# Patient Record
Sex: FEMALE | Race: BLACK OR AFRICAN AMERICAN | Employment: UNEMPLOYED | URBAN - METROPOLITAN AREA
[De-identification: names, ages, dates, MRNs, and addresses within clinical notes are randomized per-mention and may not be internally consistent; named-entity substitution may affect disease eponyms.]

---

## 2017-12-29 PROBLEM — K21.9 GASTROESOPHAGEAL REFLUX DISEASE WITHOUT ESOPHAGITIS: Status: ACTIVE | Noted: 2017-12-29

## 2017-12-29 PROBLEM — G47.33 OSA (OBSTRUCTIVE SLEEP APNEA): Status: ACTIVE | Noted: 2017-12-29

## 2017-12-29 PROBLEM — R06.02 SOB (SHORTNESS OF BREATH): Status: ACTIVE | Noted: 2017-12-29

## 2018-03-15 ENCOUNTER — HOSPITAL ENCOUNTER (OUTPATIENT)
Age: 58
Discharge: HOME | End: 2018-03-17
Payer: MEDICAID

## 2018-03-15 DIAGNOSIS — J84.9 ILD (INTERSTITIAL LUNG DISEASE) (HCC): ICD-10-CM

## 2018-03-15 PROCEDURE — 86038 ANTINUCLEAR ANTIBODIES: CPT

## 2018-03-15 PROCEDURE — 86255 FLUORESCENT ANTIBODY SCREEN: CPT

## 2018-03-15 PROCEDURE — 86039 ANTINUCLEAR ANTIBODIES (ANA): CPT

## 2018-03-15 PROCEDURE — 86431 RHEUMATOID FACTOR QUANT: CPT

## 2018-03-15 PROCEDURE — 86235 NUCLEAR ANTIGEN ANTIBODY: CPT

## 2018-03-15 PROCEDURE — 86140 C-REACTIVE PROTEIN: CPT

## 2018-03-15 PROCEDURE — 85651 RBC SED RATE NONAUTOMATED: CPT

## 2018-03-15 PROCEDURE — 86225 DNA ANTIBODY NATIVE: CPT

## 2018-03-16 LAB
ANA PATTERN: ABNORMAL
ANA TITER: ABNORMAL
ANTI DNA DOUBLE STRANDED: NEGATIVE
ANTI-NUCLEAR ANTIBODY (ANA): POSITIVE
C-REACTIVE PROTEIN: 1.1 MG/DL (ref 0–0.4)
ENA TO SSA (RO) ANTIBODY: POSITIVE
ENA TO SSB (LA) ANTIBODY: NEGATIVE
RHEUMATOID FACTOR: 11 IU/ML (ref 0–13)
SCLERODERMA (SCL-70) AB: NEGATIVE
SEDIMENTATION RATE, ERYTHROCYTE: 41 MM/HR (ref 0–20)

## 2018-03-17 LAB — ANCA IFA: NORMAL

## 2018-03-20 ENCOUNTER — OFFICE VISIT (OUTPATIENT)
Dept: CARDIOLOGY CLINIC | Age: 58
End: 2018-03-20
Payer: MEDICAID

## 2018-03-20 VITALS
RESPIRATION RATE: 16 BRPM | BODY MASS INDEX: 39.27 KG/M2 | WEIGHT: 230 LBS | HEART RATE: 75 BPM | SYSTOLIC BLOOD PRESSURE: 112 MMHG | HEIGHT: 64 IN | DIASTOLIC BLOOD PRESSURE: 76 MMHG

## 2018-03-20 DIAGNOSIS — G47.33 OSA (OBSTRUCTIVE SLEEP APNEA): ICD-10-CM

## 2018-03-20 DIAGNOSIS — I34.0 NON-RHEUMATIC MITRAL REGURGITATION: ICD-10-CM

## 2018-03-20 DIAGNOSIS — K21.9 GASTROESOPHAGEAL REFLUX DISEASE WITHOUT ESOPHAGITIS: ICD-10-CM

## 2018-03-20 DIAGNOSIS — R07.2 PRECORDIAL PAIN: Primary | ICD-10-CM

## 2018-03-20 DIAGNOSIS — E78.2 MIXED HYPERLIPIDEMIA: ICD-10-CM

## 2018-03-20 DIAGNOSIS — I10 ESSENTIAL HYPERTENSION: ICD-10-CM

## 2018-03-20 PROCEDURE — 99214 OFFICE O/P EST MOD 30 MIN: CPT | Performed by: PHYSICIAN ASSISTANT

## 2018-03-20 PROCEDURE — 3017F COLORECTAL CA SCREEN DOC REV: CPT | Performed by: PHYSICIAN ASSISTANT

## 2018-03-20 PROCEDURE — G8484 FLU IMMUNIZE NO ADMIN: HCPCS | Performed by: PHYSICIAN ASSISTANT

## 2018-03-20 PROCEDURE — 93000 ELECTROCARDIOGRAM COMPLETE: CPT | Performed by: INTERNAL MEDICINE

## 2018-03-20 PROCEDURE — G8598 ASA/ANTIPLAT THER USED: HCPCS | Performed by: PHYSICIAN ASSISTANT

## 2018-03-20 PROCEDURE — 4004F PT TOBACCO SCREEN RCVD TLK: CPT | Performed by: PHYSICIAN ASSISTANT

## 2018-03-20 PROCEDURE — G8417 CALC BMI ABV UP PARAM F/U: HCPCS | Performed by: PHYSICIAN ASSISTANT

## 2018-03-20 PROCEDURE — G8427 DOCREV CUR MEDS BY ELIG CLIN: HCPCS | Performed by: PHYSICIAN ASSISTANT

## 2018-03-20 PROCEDURE — 3014F SCREEN MAMMO DOC REV: CPT | Performed by: PHYSICIAN ASSISTANT

## 2018-03-20 NOTE — PATIENT INSTRUCTIONS
fast or irregular heartbeat. After you call 911, the  may tell you to chew 1 adult-strength or 2 to 4 low-dose aspirin. Wait for an ambulance. Do not try to drive yourself. ?Call your doctor today if:  ? · You have any trouble breathing. ? · Your chest pain gets worse. ? · You are dizzy or lightheaded, or you feel like you may faint. ? · You are not getting better as expected. ? · You are having new or different chest pain. Where can you learn more? Go to https://E[employer]BuddiesÃ¢â€[email or web address]¢ InteractivepeSwipesenseeb.[employer] Secure. org and sign in to your [employer] account. Enter A120 in the Xtreme[employer] box to learn more about \"Chest Pain: Care Instructions. \"     If you do not have an account, please click on the \"Sign Up Now\" link. Current as of: March 20, 2017  Content Version: 11.5  © 3862-1638 [employer]. Care instructions adapted under license by [facility] ([facility]). If you have questions about a medical condition or this instruction, always ask your healthcare professional. [place] [address] any warranty or liability for your use of this information.

## 2018-03-26 ENCOUNTER — HOSPITAL ENCOUNTER (OUTPATIENT)
Dept: CARDIOLOGY | Age: 58
Discharge: HOME | End: 2018-03-26
Payer: MEDICAID

## 2018-03-26 DIAGNOSIS — I27.21 PAH (PULMONARY ARTERY HYPERTENSION) (HCC): ICD-10-CM

## 2018-03-26 LAB
LV EF: 60 %
LVEF MODALITY: NORMAL

## 2018-03-26 PROCEDURE — 93306 TTE W/DOPPLER COMPLETE: CPT

## 2018-03-27 ENCOUNTER — TELEPHONE (OUTPATIENT)
Dept: CARDIOLOGY CLINIC | Age: 58
End: 2018-03-27

## 2018-04-09 PROBLEM — L02.93 CARBUNCLE: Status: ACTIVE | Noted: 2018-04-09

## 2018-07-23 PROBLEM — R76.8 ANA POSITIVE: Status: ACTIVE | Noted: 2018-07-23

## 2018-07-30 ENCOUNTER — OFFICE VISIT (OUTPATIENT)
Dept: CARDIOLOGY CLINIC | Age: 58
End: 2018-07-30
Payer: MEDICAID

## 2018-07-30 VITALS
DIASTOLIC BLOOD PRESSURE: 70 MMHG | RESPIRATION RATE: 16 BRPM | BODY MASS INDEX: 40.46 KG/M2 | WEIGHT: 237 LBS | HEART RATE: 78 BPM | HEIGHT: 64 IN | SYSTOLIC BLOOD PRESSURE: 120 MMHG

## 2018-07-30 DIAGNOSIS — I25.10 CORONARY ARTERY DISEASE INVOLVING NATIVE HEART WITHOUT ANGINA PECTORIS, UNSPECIFIED VESSEL OR LESION TYPE: Primary | ICD-10-CM

## 2018-07-30 PROCEDURE — G8427 DOCREV CUR MEDS BY ELIG CLIN: HCPCS | Performed by: INTERNAL MEDICINE

## 2018-07-30 PROCEDURE — G8598 ASA/ANTIPLAT THER USED: HCPCS | Performed by: INTERNAL MEDICINE

## 2018-07-30 PROCEDURE — G8417 CALC BMI ABV UP PARAM F/U: HCPCS | Performed by: INTERNAL MEDICINE

## 2018-07-30 PROCEDURE — 4004F PT TOBACCO SCREEN RCVD TLK: CPT | Performed by: INTERNAL MEDICINE

## 2018-07-30 PROCEDURE — 93000 ELECTROCARDIOGRAM COMPLETE: CPT | Performed by: INTERNAL MEDICINE

## 2018-07-30 PROCEDURE — 3017F COLORECTAL CA SCREEN DOC REV: CPT | Performed by: INTERNAL MEDICINE

## 2018-07-30 PROCEDURE — 99214 OFFICE O/P EST MOD 30 MIN: CPT | Performed by: INTERNAL MEDICINE

## 2018-07-30 NOTE — PROGRESS NOTES
CHIEF COMPLAINT: CAD/MI    HISTORY OF PRESENT ILLNESS: Patient is a 62 y.o. female seen at the request of Can Sadners MD.      Patient in follow up chest pain. Stress 10/17 and echo normal 3/18. Past Medical History:   Diagnosis Date    CAD (coronary artery disease)     Chest pain     HTN (hypertension)     Hyperlipemia        Patient Active Problem List   Diagnosis    CAD (coronary artery disease)    Chest pain    Hyperlipemia    HTN (hypertension)    Gastroesophageal reflux disease without esophagitis    SOB (shortness of breath)    DANA (obstructive sleep apnea)    Carbuncle    CAROLINE positive       No Known Allergies    Current Outpatient Prescriptions   Medication Sig Dispense Refill    traMADol (ULTRAM) 50 MG tablet Take 1 tablet by mouth every 8 hours as needed for Pain for up to 30 days. . 90 tablet 0    amitriptyline (ELAVIL) 10 MG tablet Take 1 tablet by mouth nightly as needed for Sleep or Pain 90 tablet 1    omeprazole (PRILOSEC) 20 MG delayed release capsule Take 20 mg by mouth daily      umeclidinium-vilanterol (ANORO ELLIPTA) 62.5-25 MCG/INH AEPB inhaler Inhale 1 puff into the lungs daily 1 each 5    atorvastatin (LIPITOR) 40 MG tablet Take 1 tablet by mouth daily 90 tablet 3    aspirin 325 MG EC tablet Take 1 tablet by mouth daily 90 tablet 3    metoprolol tartrate (LOPRESSOR) 25 MG tablet Take 1 tablet by mouth daily 180 tablet 3    doxycycline hyclate (VIBRA-TABS) 100 MG tablet Take 1 tablet by mouth 2 times daily for 20 days 40 tablet 0    ADVAIR DISKUS 250-50 MCG/DOSE AEPB Inhale 1 puff into the lungs every 12 hours 60 each 3     No current facility-administered medications for this visit.         Social History     Social History    Marital status: Single     Spouse name: N/A    Number of children: N/A    Years of education: N/A     Occupational History    not employed      Social History Main Topics    Smoking status: Current Every Day Smoker     Packs/day: 1.00 Musculoskeletal: Normal range of motion. No edema and no tenderness. Lymphadenopathy:   No cervical adenopathy. No groin adenopathy. Neurological: Alert and oriented to person, place, and time. Skin: Skin is warm and dry. No rash noted. Not diaphoretic. No erythema. Psychiatric: Normal mood and affect. Behavior is normal.     EKG:  normal sinus rhythm, nonspecific ST and T waves changes. ASSESSMENT AND PLAN:  Patient Active Problem List   Diagnosis    CAD (coronary artery disease)    Chest pain    Hyperlipemia    HTN (hypertension)    Gastroesophageal reflux disease without esophagitis    SOB (shortness of breath)    DANA (obstructive sleep apnea)    Carbuncle    CAROLINE positive     1. Chest pain/CAD:    Stress normal 10/17. Echo with normal heart function and mild MR 3/18. Continue ASA/statin/BB. 2. HTN: Observe. 3. Lipid: Statin. Marcelo Mark D.O.   Cardiologist  Cardiology, [facility]

## 2019-01-26 ENCOUNTER — HOSPITAL ENCOUNTER (OUTPATIENT)
Dept: GENERAL RADIOLOGY | Age: 59
Discharge: HOME | End: 2019-01-28
Payer: MEDICAID

## 2019-01-26 ENCOUNTER — HOSPITAL ENCOUNTER (OUTPATIENT)
Age: 59
End: 2019-01-26
Payer: MEDICAID

## 2019-01-26 DIAGNOSIS — R10.9 ABDOMINAL PAIN, UNSPECIFIED ABDOMINAL LOCATION: ICD-10-CM

## 2019-01-26 PROCEDURE — 74018 RADEX ABDOMEN 1 VIEW: CPT

## 2019-02-25 PROBLEM — M65.342 TRIGGER FINGER OF ALL DIGITS OF LEFT HAND: Status: ACTIVE | Noted: 2019-02-25

## 2019-02-25 PROBLEM — M65.352 TRIGGER FINGER OF ALL DIGITS OF LEFT HAND: Status: ACTIVE | Noted: 2019-02-25

## 2019-02-25 PROBLEM — M65.322 TRIGGER FINGER OF ALL DIGITS OF LEFT HAND: Status: ACTIVE | Noted: 2019-02-25

## 2019-02-25 PROBLEM — M65.312 TRIGGER FINGER OF ALL DIGITS OF LEFT HAND: Status: ACTIVE | Noted: 2019-02-25

## 2019-02-25 PROBLEM — M65.332 TRIGGER FINGER OF ALL DIGITS OF LEFT HAND: Status: ACTIVE | Noted: 2019-02-25

## 2019-04-12 ENCOUNTER — TELEPHONE (OUTPATIENT)
Dept: ORTHOPEDIC SURGERY | Age: 59
End: 2019-04-12

## 2019-04-12 DIAGNOSIS — M65.312 TRIGGER FINGER OF ALL DIGITS OF LEFT HAND: Primary | ICD-10-CM

## 2019-04-12 DIAGNOSIS — M65.332 TRIGGER FINGER OF ALL DIGITS OF LEFT HAND: Primary | ICD-10-CM

## 2019-04-12 DIAGNOSIS — M65.352 TRIGGER FINGER OF ALL DIGITS OF LEFT HAND: Primary | ICD-10-CM

## 2019-04-12 DIAGNOSIS — M65.342 TRIGGER FINGER OF ALL DIGITS OF LEFT HAND: Primary | ICD-10-CM

## 2019-04-12 DIAGNOSIS — M65.322 TRIGGER FINGER OF ALL DIGITS OF LEFT HAND: Primary | ICD-10-CM

## 2019-04-15 ENCOUNTER — TELEPHONE (OUTPATIENT)
Dept: OBGYN | Age: 59
End: 2019-04-15

## 2019-04-15 ENCOUNTER — OFFICE VISIT (OUTPATIENT)
Dept: ORTHOPEDIC SURGERY | Age: 59
End: 2019-04-15
Payer: MEDICAID

## 2019-04-15 VITALS
RESPIRATION RATE: 18 BRPM | HEIGHT: 64 IN | DIASTOLIC BLOOD PRESSURE: 77 MMHG | SYSTOLIC BLOOD PRESSURE: 123 MMHG | WEIGHT: 230 LBS | HEART RATE: 81 BPM | BODY MASS INDEX: 39.27 KG/M2

## 2019-04-15 DIAGNOSIS — M79.641 BILATERAL HAND PAIN: ICD-10-CM

## 2019-04-15 DIAGNOSIS — M79.642 BILATERAL HAND PAIN: ICD-10-CM

## 2019-04-15 DIAGNOSIS — S69.82XA TFCC (TRIANGULAR FIBROCARTILAGE COMPLEX) INJURY, LEFT, INITIAL ENCOUNTER: ICD-10-CM

## 2019-04-15 DIAGNOSIS — M65.4 TENOSYNOVITIS, DE QUERVAIN: ICD-10-CM

## 2019-04-15 DIAGNOSIS — G56.02 LEFT CARPAL TUNNEL SYNDROME: Primary | ICD-10-CM

## 2019-04-15 PROCEDURE — G8598 ASA/ANTIPLAT THER USED: HCPCS | Performed by: ORTHOPAEDIC SURGERY

## 2019-04-15 PROCEDURE — G8417 CALC BMI ABV UP PARAM F/U: HCPCS | Performed by: ORTHOPAEDIC SURGERY

## 2019-04-15 PROCEDURE — 4004F PT TOBACCO SCREEN RCVD TLK: CPT | Performed by: ORTHOPAEDIC SURGERY

## 2019-04-15 PROCEDURE — G8427 DOCREV CUR MEDS BY ELIG CLIN: HCPCS | Performed by: ORTHOPAEDIC SURGERY

## 2019-04-15 PROCEDURE — 20526 THER INJECTION CARP TUNNEL: CPT | Performed by: ORTHOPAEDIC SURGERY

## 2019-04-15 PROCEDURE — L3908 WHO COCK-UP NONMOLDE PRE OTS: HCPCS | Performed by: ORTHOPAEDIC SURGERY

## 2019-04-15 PROCEDURE — 3017F COLORECTAL CA SCREEN DOC REV: CPT | Performed by: ORTHOPAEDIC SURGERY

## 2019-04-15 PROCEDURE — 99203 OFFICE O/P NEW LOW 30 MIN: CPT | Performed by: ORTHOPAEDIC SURGERY

## 2019-04-15 PROCEDURE — 20605 DRAIN/INJ JOINT/BURSA W/O US: CPT | Performed by: ORTHOPAEDIC SURGERY

## 2019-04-15 RX ORDER — TRAMADOL HYDROCHLORIDE 50 MG/1
TABLET ORAL
Refills: 0 | COMMUNITY
Start: 2019-04-08 | End: 2019-05-31 | Stop reason: SDUPTHER

## 2019-04-15 RX ORDER — BETAMETHASONE SODIUM PHOSPHATE AND BETAMETHASONE ACETATE 3; 3 MG/ML; MG/ML
12 INJECTION, SUSPENSION INTRA-ARTICULAR; INTRALESIONAL; INTRAMUSCULAR; SOFT TISSUE ONCE
Status: COMPLETED | OUTPATIENT
Start: 2019-04-15 | End: 2019-04-15

## 2019-04-15 RX ADMIN — BETAMETHASONE SODIUM PHOSPHATE AND BETAMETHASONE ACETATE 12 MG: 3; 3 INJECTION, SUSPENSION INTRA-ARTICULAR; INTRALESIONAL; INTRAMUSCULAR; SOFT TISSUE at 09:17

## 2019-04-15 NOTE — TELEPHONE ENCOUNTER
Patient called to cancel her appointment on Friday, 4/19 and asked if she could reschedule to 4/22.  Scheduled patient for 4/22/19 @ 1:15p.    -Cain, 4/15/19

## 2019-04-15 NOTE — PROGRESS NOTES
Department of Orthopedic Surgery/Hand Surgery  Resident Office Note        CHIEF COMPLAINT:    Chief Complaint   Patient presents with    Hand Pain     left hand pain        History Obtained From:  patient    HISTORY OF PRESENT ILLNESS:                Irving Gambino is a 62y.o. year old  female who presents for evaluation of left hand pain. she reports this started years ago. she does not remember a specific injury that started the pain. She states her pain is mostly cramping pain to the left hand with use. She does get numbness over the radial fingers. The pain does wake her up at night. Pain is better with wrist brace. She hasn't tried any medication for it at this time. The treatment has not been effective. Patient is being worked up for inflammatory dz with appointment wit rheumatology on Friday. The patient is Right hand dominant. The patients occupation is Disabled . Past Medical History:    Past Medical History:   Diagnosis Date    CAD (coronary artery disease)     Chest pain     HTN (hypertension)     Hyperlipemia      Past Surgical History:    Past Surgical History:   Procedure Laterality Date    CORONARY ANGIOPLASTY WITH STENT PLACEMENT       Current Medications:   No current facility-administered medications for this visit.    Allergies:  No Known Allergies    Social History:   Social History     Socioeconomic History    Marital status: Single     Spouse name: Not on file    Number of children: Not on file    Years of education: Not on file    Highest education level: Not on file   Occupational History    Occupation: not employed   Social Needs    Financial resource strain: Not on file    Food insecurity:     Worry: Not on file     Inability: Not on file   ENEFpro needs:     Medical: Not on file     Non-medical: Not on file   Tobacco Use    Smoking status: Current Every Day Smoker     Packs/day: 1.00     Years: 39.00     Pack years: 39.00     Types: Cigarettes  Smokeless tobacco: Never Used    Tobacco comment: smokes 2 cigs per day   Substance and Sexual Activity    Alcohol use: Yes     Comment: social    Drug use: No    Sexual activity: Not on file   Lifestyle    Physical activity:     Days per week: Not on file     Minutes per session: Not on file    Stress: Not on file   Relationships    Social connections:     Talks on phone: Not on file     Gets together: Not on file     Attends Jew service: Not on file     Active member of club or organization: Not on file     Attends meetings of clubs or organizations: Not on file     Relationship status: Not on file    Intimate partner violence:     Fear of current or ex partner: Not on file     Emotionally abused: Not on file     Physically abused: Not on file     Forced sexual activity: Not on file   Other Topics Concern    Not on file   Social History Narrative    Not on file     Family History:   Family History   Problem Relation Age of Onset    Diabetes Mother     High Blood Pressure Mother     Other Father         bleeding ulcer    Asthma Sister     High Blood Pressure Brother     Diabetes Brother     Stroke Brother     Diabetes Brother     Stroke Brother        REVIEW OF SYSTEMS:    CONSTITUTIONAL:  negative for  fevers, chills, sweats and fatigue  RESPIRATORY:  negative for  dry cough, cough with sputum, dyspnea, wheezing and chest pain  CARDIOVASCULAR:  negative for  chest pain, dyspnea, palpitations, syncope  GASTROINTESTINAL:  negative for nausea, vomiting, change in bowel habits, diarrhea, constipation and abdominal pain  BEHAVIOR/PSYCH:  negative for poor appetite, increased appetite, decreased sleep and poor concentration  MUSCULOSKELETAL:  positive for  myalgias and arthralgias      PHYSICAL EXAM:    VITALS:  /77 (Site: Left Upper Arm, Position: Sitting)   Pulse 81   Resp 18   Ht 5' 4\" (1.626 m)   Wt 230 lb (104.3 kg)   LMP  (LMP Unknown)   BMI 39.48 kg/m²     CONSTITUTIONAL: awake, alert, cooperative, no apparent distress, and appears stated age    LUNGS:  No increased work of breathing, good air exchange, clear to auscultation bilaterally, no crackles or wheezing    CARDIOVASCULAR:  Normal apical impulse, regular rate and rhythm, normal S1 and S2, no S3 or S4, and no murmur noted    ABDOMEN: Soft, Non-tender to palpation     left Hand exam:    The skin overlying the hand is  intact. There is not evidence of scar, lesion, laceration, or abrasion. The motion in the small joints of the hand are intact with no stiffness or deformity. There is no masses or adenopathy in bilateral upper extremities. Radial pulses are 2+ and symmetric bilaterally. Capillary refill is intact and < 2 seconds. Motor strength is 5/5 with flexion and extension of the small finger joints. The ROM to fingers are normal.  APB 5/5, Neg tenderness over the A1 pulleys with no crepitus. + tenderness over the 1st dorsal compartment, + Fovea tenderness  , - Shuck test distal ulna     Left:  Phallens sign negative, Tinnells sign negative, Median nerve compression test positive ,  Finklesteins positive, CMC Grind test positive,       DATA:    Radiology Review:  left Hand XR - 3 views - AP/Lateral/Oblique    Showing grade 2 CMC arthritis , No fractures or dislocation, no soft tissue swelling   Impression : Grade 2 CMC Arthritis     IMPRESSION/RECOMMENDATIONS:      Impression:   B/L Carpal tunnel Syndrome R>L  Left De quervain's  Left Wrist pain possible TFCC injury     Discussed treatment options and diagnosis. Will get B/L EMG to evaluated the carpal tunnel. Will provide steroid injections to the 1st dorsal compartment and the TFCC today and Provide a cock up wrist splint. Follow up after EMG for Review. Procedure Note Wrist Cortisone Injection    The left wrist TFCC was identified as the injection site. The risk and benefits of a cortisone injection were explained and the patient consented to the injection.  Under sterile conditions, the wrist was injected with a mixture of 1 mL of 1% Lidocaine and 1 mL of 6 mg/mL Betamethasone without complication. A sterile bandage was applied. Procedure Note DeQuerveins Injection    The left first dorsal wrist compartment was identified as the injection site. The risk and benefits of a cortisone injection were explained and the patient consented to the injection. Under sterile conditions, the wrist was injected with a mixture of 1 mL of 1% Lidocaine and 1 mL of 6 mg/mL Betamethasone without complication. A sterile bandage was applied. I have seen and evaluated the patient and agree with the above assessment and plan on today's visit. I have performed the key components of the history and physical examination with significant findings of chronic left wrist pain status post injury three years ago. Reports persistent pain over the 1st dorsal extensor Compartment, dorsal ulnar wrist, TFCC, as well as numbness and tingling in the bilateral hands. Diagnostic and potentially therapeutic injections provided for de Quervain's tenosynovitis and degenerative TFCC tear. Follow-up after EMG and nerve conduction studies completed. I concur with the findings and plan as documented.     Raymond Knowles MD  4/15/2019

## 2019-04-29 ENCOUNTER — HOSPITAL ENCOUNTER (OUTPATIENT)
Dept: NEUROLOGY | Age: 59
Discharge: HOME | End: 2019-04-29
Payer: MEDICAID

## 2019-04-29 VITALS — HEIGHT: 65 IN | BODY MASS INDEX: 38.65 KG/M2 | WEIGHT: 232 LBS

## 2019-04-29 DIAGNOSIS — M79.642 BILATERAL HAND PAIN: ICD-10-CM

## 2019-04-29 DIAGNOSIS — M79.641 BILATERAL HAND PAIN: ICD-10-CM

## 2019-04-29 PROCEDURE — 95886 MUSC TEST DONE W/N TEST COMP: CPT

## 2019-04-29 PROCEDURE — 95913 NRV CNDJ TEST 13/> STUDIES: CPT

## 2019-04-29 NOTE — LETTER
RE:  Cyrus Mcgee    Dear Dr. Marcelle Ware,     Enclosed you will find a copy of the requested EMG on your patient, Cyrus Mcgee completed 4/29/2019. EMG Findings:     Gardenia Pittman MD   Physician   Internal Medicine   Procedures   Signed   Date of Service:  4/29/2019 11:18 AM            Procedure Orders   EMG [[id number]] ordered by  at 04/15/19 0849   Pre-procedure Diagnoses   Bilateral hand pain [M79.641, M79.642]   Procedures   EMG [NEU5]          Signed                 Show:Clear all  [x]Manual[x]Template[x]Copied    Added by:  Nancy Espinoza MD      []Hover for details      EMG [address] [facility]  Electrodiagnostic Laboratory   [place]          Full Name:    Cyrus Mcgee                Gender:             Female  MRN:             [id number]                     YOB: 1960  Location[de-identified]     Outpt. Visit Date:                          4/29/2019 08:40  Age:                                   62 Years 5 Months Old  Examining Physician:      Dr. Fanny Pillai  Referring Physician:        Dr. Nancy Caldwell  Technician:                       Janette Dominguez   Height:                               5 feet 5 inch  Weight:                              232 lbs  Notes:                                Bilat. hand pain M79.641      Motor NCS      Nerve / Sites Lat. Lat Diff Amplitude Amp. 1-2 Distance Velocity Temp.     ms ms mV % cm m/s °C   R Median - APB      Wrist 2.86   14.5 100 8   32.9      Elbow 6.67 3.80 13.5 93.3 21 55 32.9   L Median - APB      Wrist 3.07   12.7 100 8   32. 3      Elbow 6.77 3.70 10.3 81 20 54 32.3   R Ulnar - ADM      Wrist 2.40   9.6 100 8   32.5      B. Elbow 5.94 3.54 9.6 99.6 20 56 32.5      A. Elbow 7.66 1.72 9.5 98.8 10 58 32.4   L Ulnar - ADM      Wrist 2.40   10.2 100 8   32.1      B. Elbow 5.78 3.39 10.1 98.9 20 59 32.1      A. Elbow 7.19 1.41 9.5 93.5 10 71 32       Sensory NCS Nerve / Sites Onset Lat Peak Lat PP Amp Distance Velocity Temp.     ms ms µV cm m/s °C   L Median - Digit II (Antidromic)      Mid Palm 1.25 1.67 85.6 7 56 32.1      Wrist 2.50 3.18 58.9 14 56 32.1   R Median - Digit II (Antidromic)      Mid Palm 1.20 1.88 54.3 7 58 32      Wrist 2.66 3.54 41.8 14 53 32   L Ulnar - Digit V (Antidromic)      Wrist 2.55 3.23 42.9 14 55 32.1   R Ulnar - Digit V (Antidromic)      Wrist 2.66 3.18 43.4 14 53 32.1   R Radial - Anatomical snuff box (Forearm)      Forearm 1.61 2.14 40.1 10 62 32.1   L Radial - Anatomical snuff box (Forearm)      Forearm 1.51 2.03 42.7 10 66 32.3       Combined Sensory Index      Nerve / Sites Rec. Site Peak Lat NP Amp PP Amp Segments Peak Diff Temp.       ms µV µV   ms °C   R Median - CSI      Median Thumb 2.92 18.2 22.7 Median - Radial 0.42 32.1      Radial Thumb 2.50 17.8 15.2 Median - Ulnar 0.10 32.1      Median Ring 3.39 20.1 22.0 Median palm - Ulnar palm 0.31 32.1      Ulnar Ring 3.28 14.0 26.6            Median palm Wrist 2.03 35.7 38.1            Ulnar palm Wrist 1.72 13.6 26.1            CSI         CSI 0.83     L Median - CSI      Median Thumb 2.76 35.4 58.5 Median - Radial 0.42 32.1      Radial Thumb 2.34 14.3 14.2 Median - Ulnar 0.00 32      Median Ring 3.44 24.9 33.9 Median palm - Ulnar palm 0.16 32.3      Ulnar Ring 3.44 13.7 7.7            Median palm Wrist 1.88 55.0 65.0            Ulnar palm Wrist 1.72 17.2 26.3            CSI         CSI 0.57             F  Wave      Nerve F Lat M Lat F-M Lat     ms ms ms   R Median - APB 24.9 3.1 21.9   R Ulnar - ADM 26.7 2.2 24.5   L Median - APB 25.1 3.3 21.7   L Ulnar - ADM 25.6 2.5 23.1       EMG                     EMG Summary Table       Spontaneous MUAP Recruitment   Muscle IA Fib PSW Fasc H.F. Amp Dur. PPP Pattern   R. Abductor pollicis brevis N None None None None N N N N   R. First dorsal interosseous N None None None None N N N N   R.  Abductor digiti minimi (manus) N None None None None N N N N positive waves, fibrillation potentials, or abnormal recruitment pattern. There were polyphasic waves found bilaterally in the lower cervical paraspinals. Please refer to the above chart for specific muscles examined and results. .        Impression:    #1 normal nerve conduction studies of the median, ulnar, and radial nerves bilaterally. No diagnostic evidence of entrapment syndromes present in the sampled nerves.     #2 no diagnostic evidence of a motor cervical radiculopathy. Pure sensory radiculopathies cannot be detected by electrodiagnostic technique.     #3 no evidence of primary myopathy.     Clinical correlation recommended. Thank you        Electronically signed by Nathaniel Mendoza MD on 3/52/0816 at 11:18 AM                       History:  Anoop Diaz complains of a \"sharp pain\" in her arms from the palm and wrist into the fingertips, digits 1 through 5. Symptoms are equal bilaterally. Discomfort often times radiates up into the forearm and occasionally further into the shoulder area. It is not related to activities other than \"picking up something heavy\". Occasionally she has complaints of nocturnal symptoms. Although she complains of neck discomfort, there is no radiation into the upper extremities. Also, symptoms are not related to the onset of neck discomfort. She does not suggest any weakness that is progressive involving the upper extremity muscles. .     ROS:   We see the above history. In addition to the neck discomfort and symptoms she describes, she does describe arthritis complaints, and coronary artery disease with difficulty with exercise, walking, dyspnea on exertion, etc. Remainder system review negative other than pain in her shoulders bilaterally. Meds:    Prior to Admission medications    Medication Sig Start Date End Date Taking?  Authorizing Provider   traMADol (ULTRAM) 50 MG tablet TAKE 1 TABLET BY MOUTH EVERY 8 HOURS AS NEEDED FOR PAIN 4/8/19   Historical Provider, MD naproxen (NAPROSYN) 500 MG tablet TAKE 1 TABLET BY MOUTH TWICE DAILY WITH MEALS 3/5/19   Mihir Mccoy MD   atorvastatin (LIPITOR) 40 MG tablet Take 1 tablet by mouth daily 1/23/19 1/23/20  Mihir Mccoy MD   amitriptyline (ELAVIL) 10 MG tablet Take 1 tablet by mouth nightly as needed for Sleep or Pain 1/23/19   Mihir Mccoy MD   omeprazole (PRILOSEC) 20 MG delayed release capsule Take 1 capsule by mouth daily 1/23/19 1/23/20  Mihir Mccoy MD   aspirin 325 MG EC tablet Take 1 tablet by mouth daily 10/29/18 10/29/19  Nallely Booth DO   metoprolol tartrate (LOPRESSOR) 25 MG tablet Take 1 tablet by mouth daily 9/24/18   Nallely Booth DO       PMH:     Past Medical History:   Diagnosis Date    CAD (coronary artery disease)     Chest pain     HTN (hypertension)     Hyperlipemia        PSH:    Past Surgical History:   Procedure Laterality Date    CORONARY ANGIOPLASTY WITH STENT PLACEMENT         Social History:    Social History     Socioeconomic History    Marital status: Single     Spouse name: Not on file    Number of children: Not on file    Years of education: Not on file    Highest education level: Not on file   Occupational History    Occupation: not employed   Social Needs    Financial resource strain: Not on file    Food insecurity:     Worry: Not on file     Inability: Not on file   Feast needs:     Medical: Not on file     Non-medical: Not on file   Tobacco Use    Smoking status: Current Every Day Smoker     Packs/day: 1.00     Years: 39.00     Pack years: 39.00     Types: Cigarettes    Smokeless tobacco: Never Used    Tobacco comment: smokes 2 cigs per day   Substance and Sexual Activity    Alcohol use: Yes     Comment: social    Drug use: No    Sexual activity: Not on file   Lifestyle    Physical activity:     Days per week: Not on file     Minutes per session: Not on file    Stress: Not on file   Relationships    Social connections:     Talks on phone: Not on file Gets together: Not on file     Attends Mor[employer] service: Not on file     Active member of club or organization: Not on file     Attends meetings of clubs or organizations: Not on file     Relationship status: Not on file    Intimate partner violence:     Fear of current or ex partner: Not on file     Emotionally abused: Not on file     Physically abused: Not on file     Forced sexual activity: Not on file   Other Topics Concern    Not on file   Social History Narrative    Not on file       Family History:    Family History   Problem Relation Age of Onset    Diabetes Mother     High Blood Pressure Mother     Other Father         bleeding ulcer    Asthma Sister     High Blood Pressure Brother     Diabetes Brother     Stroke Brother     Diabetes Brother     Stroke Brother        Exam:  Exam reveals a 51-year-old female 5 foot 5 inches weighing 232 pounds. Cognitively intact and follows commands. Skin:  Skin in the upper extremity normal color, temperature, and texture. No open lesions noted. Motor examination in the upper extremities demonstrates no focal motor weakness. In general she is G minus/and in grading. .    Sensory examination intact to touch in the upper extremities. .     Reflexes the medical and the upper extremities. .     Cervical range of motion limited to 45° lateral rotation. Painful arc motion of the shoulder joints actively. Summary: Please refer to the results of the electrodiagnostic study. If there are any questions, please contact me for discussion. Thank you once again for allowing me to participate along with you in his behalf.             Electronically signed by Chepe Porter MD on 3/39/4551 at 11:22 AM

## 2019-04-29 NOTE — PROCEDURES
[facility] [address] [facility]  Electrodiagnostic Laboratory   [place]         Full Name: Jewel Lakhani Gender: Female  MRN: [id number] YOB: 1960  Location[de-identified] Outpt. Visit Date: 4/29/2019 08:40  Age: 62 Years 5 Months Old  Examining Physician: Dr. Janette Orr  Referring Physician: Dr. Duncan Burkitt  Technician: Petra Floyd   Height: 5 feet 5 inch  Weight: 232 lbs  Notes: Bilat. hand pain M79.641      Motor NCS      Nerve / Sites Lat. Lat Diff Amplitude Amp. 1-2 Distance Velocity Temp.    ms ms mV % cm m/s °C   R Median - APB      Wrist 2.86  14.5 100 8  32.9      Elbow 6.67 3.80 13.5 93.3 21 55 32.9   L Median - APB      Wrist 3.07  12.7 100 8  32.3      Elbow 6.77 3.70 10.3 81 20 54 32.3   R Ulnar - ADM      Wrist 2.40  9.6 100 8  32.5      B. Elbow 5.94 3.54 9.6 99.6 20 56 32.5      A. Elbow 7.66 1.72 9.5 98.8 10 58 32.4   L Ulnar - ADM      Wrist 2.40  10.2 100 8  32.1      B. Elbow 5.78 3.39 10.1 98.9 20 59 32.1      A. Elbow 7.19 1.41 9.5 93.5 10 71 32       Sensory NCS      Nerve / Sites Onset Lat Peak Lat PP Amp Distance Velocity Temp.    ms ms µV cm m/s °C   L Median - Digit II (Antidromic)      Mid Palm 1.25 1.67 85.6 7 56 32.1      Wrist 2.50 3.18 58.9 14 56 32.1   R Median - Digit II (Antidromic)      Mid Palm 1.20 1.88 54.3 7 58 32      Wrist 2.66 3.54 41.8 14 53 32   L Ulnar - Digit V (Antidromic)      Wrist 2.55 3.23 42.9 14 55 32.1   R Ulnar - Digit V (Antidromic)      Wrist 2.66 3.18 43.4 14 53 32.1   R Radial - Anatomical snuff box (Forearm)      Forearm 1.61 2.14 40.1 10 62 32.1   L Radial - Anatomical snuff box (Forearm)      Forearm 1.51 2.03 42.7 10 66 32.3       Combined Sensory Index      Nerve / Sites Rec. Site Peak Lat NP Amp PP Amp Segments Peak Diff Temp.      ms µV µV  ms °C   R Median - CSI      Median Thumb 2.92 18.2 22.7 Median - Radial 0.42 32.1      Radial Thumb 2.50 17.8 15.2 Median - Ulnar 0.10 32.1      Median Ring 3.39 20.1 22.0 Median palm (low) N None None None None N N 1+ N             Summary:  Nerve conduction studies in the upper extremities revealed normal mixed motor latency of the median and ulnar nerves bilaterally. Amplitudes and velocities bilaterally were within normal limits for the median nerve. Amplitude and velocity above and below the olecranon process for the ulnar nerves were normal bilaterally. Temperature measured in the upper extremity is normal..    Sensory studies in the upper extremities demonstrate normal peak latency of the antidromic stimulation of the median nerve bilaterally. Ulnar antidromic peak latencies were normal bilaterally. Radial peak latencies normal bilaterally. .    Combined sensory index was performed bilaterally and found to be within accepted limits of normal. F responses normal for the median and ulnar nerves bilaterally. Insertional activity in the upper extremity revealing no evidence of positive waves, fibrillation potentials, or abnormal recruitment pattern. There were polyphasic waves found bilaterally in the lower cervical paraspinals. Please refer to the above chart for specific muscles examined and results. .        Impression:    #1 normal nerve conduction studies of the median, ulnar, and radial nerves bilaterally. No diagnostic evidence of entrapment syndromes present in the sampled nerves. #2 no diagnostic evidence of a motor cervical radiculopathy. Pure sensory radiculopathies cannot be detected by electrodiagnostic technique. #3 no evidence of primary myopathy. Clinical correlation recommended.  Thank you      Electronically signed by Chai Woodruff MD on 6/12/7261 at 11:18 AM

## 2019-05-13 ENCOUNTER — HOSPITAL ENCOUNTER (OUTPATIENT)
Age: 59
Discharge: HOME | End: 2019-05-15
Payer: MEDICAID

## 2019-05-13 ENCOUNTER — OFFICE VISIT (OUTPATIENT)
Dept: OBGYN | Age: 59
End: 2019-05-13
Payer: MEDICAID

## 2019-05-13 VITALS
BODY MASS INDEX: 40.63 KG/M2 | DIASTOLIC BLOOD PRESSURE: 70 MMHG | TEMPERATURE: 98.2 F | RESPIRATION RATE: 16 BRPM | HEIGHT: 64 IN | HEART RATE: 78 BPM | SYSTOLIC BLOOD PRESSURE: 136 MMHG | WEIGHT: 238 LBS

## 2019-05-13 DIAGNOSIS — Z12.4 ENCOUNTER FOR PAPANICOLAOU SMEAR FOR CERVICAL CANCER SCREENING: ICD-10-CM

## 2019-05-13 DIAGNOSIS — Z01.419 ENCOUNTER FOR WELL WOMAN EXAM WITH ROUTINE GYNECOLOGICAL EXAM: Primary | ICD-10-CM

## 2019-05-13 DIAGNOSIS — B96.89 BV (BACTERIAL VAGINOSIS): ICD-10-CM

## 2019-05-13 DIAGNOSIS — Z12.31 ENCOUNTER FOR SCREENING MAMMOGRAM FOR BREAST CANCER: ICD-10-CM

## 2019-05-13 DIAGNOSIS — N76.0 BV (BACTERIAL VAGINOSIS): ICD-10-CM

## 2019-05-13 PROCEDURE — 99203 OFFICE O/P NEW LOW 30 MIN: CPT | Performed by: OBSTETRICS & GYNECOLOGY

## 2019-05-13 PROCEDURE — 88175 CYTOPATH C/V AUTO FLUID REDO: CPT

## 2019-05-13 PROCEDURE — 99386 PREV VISIT NEW AGE 40-64: CPT | Performed by: OBSTETRICS & GYNECOLOGY

## 2019-05-13 RX ORDER — METRONIDAZOLE 500 MG/1
500 TABLET ORAL 2 TIMES DAILY
Qty: 14 TABLET | Refills: 0 | Status: SHIPPED | OUTPATIENT
Start: 2019-05-13 | End: 2019-05-20

## 2019-05-13 NOTE — PROGRESS NOTES
Chief complaint : 62 year- old presents for well woman exam. This is a new patient to myself and our clinic. Present illness :    G3, P3,Ab0. Doing well. Periods:  Menopausal age 52, nothing since. Sexually active : no  . No abdominal or pelvic pain. No dyspareunia. No abnormal vaginal  Discharge. Notes an odor for the past week, took 5 days of some antibiotic she had at home, got better. Previous Pap smears normal. Last Pap smear 2003. Does not recall who did it. No urinary or GI symptoms. Medical/ surgical history and medications reviewed. Has never had a mammogram.  Will order that today. Had CT years ago. History of HTN and RA as well as HS.    ROS: Negative    Examination :  Vital signs reviewed. GA : Healthy. Oriented x 3 no distress. HEENT : hearing grossly intact, no jaundice, no oral lesions or nasal discharge. Neck : Supple. Thyroid : normal, no goiter. Breasts : no masses. No skin changes or lymphadenopathy. No nipple discharge. Abdomen :Soft. No masses. No organomegaly. V&V : Normal female external genitalia. BUS: Normal.  PS : Adequate. Cervix : No lesions, pap smear done as a TPP with reflex. Looks to have some BV, possible trich  Uterus : Normal size. Adnexa : Free, no masses. IMP: Normal Gyn Exam, enc fpr pap and mammogram, BV    PLAN:Pap done and sent to lab, Mammogram ordered. Rx flagyl 500 bid for 7 days.

## 2019-05-13 NOTE — PROGRESS NOTES
New pt today Pap doneand sent to the lab. Mammogram ordered  Flagyl sent to pharmacy for some   RTC in 2 years if no problems.

## 2019-05-22 ENCOUNTER — HOSPITAL ENCOUNTER (OUTPATIENT)
Dept: GENERAL RADIOLOGY | Age: 59
Discharge: HOME | End: 2019-05-24
Payer: MEDICAID

## 2019-05-22 DIAGNOSIS — Z12.31 ENCOUNTER FOR SCREENING MAMMOGRAM FOR BREAST CANCER: ICD-10-CM

## 2019-05-22 DIAGNOSIS — Z01.419 ENCOUNTER FOR WELL WOMAN EXAM WITH ROUTINE GYNECOLOGICAL EXAM: ICD-10-CM

## 2019-05-22 PROCEDURE — 77063 BREAST TOMOSYNTHESIS BI: CPT

## 2019-05-28 ENCOUNTER — HOSPITAL ENCOUNTER (EMERGENCY)
Age: 59
Discharge: HOME | End: 2019-05-29
Attending: EMERGENCY MEDICINE
Payer: MEDICAID

## 2019-05-28 DIAGNOSIS — K21.9 GASTRIC REFLUX: ICD-10-CM

## 2019-05-28 DIAGNOSIS — R07.9 CHEST PAIN, UNSPECIFIED TYPE: Primary | ICD-10-CM

## 2019-05-28 PROCEDURE — 99285 EMERGENCY DEPT VISIT HI MDM: CPT

## 2019-05-28 PROCEDURE — 94761 N-INVAS EAR/PLS OXIMETRY MLT: CPT

## 2019-05-28 ASSESSMENT — PAIN DESCRIPTION - LOCATION: LOCATION: CHEST

## 2019-05-28 ASSESSMENT — PAIN DESCRIPTION - DESCRIPTORS: DESCRIPTORS: OTHER (COMMENT)

## 2019-05-28 ASSESSMENT — PAIN SCALES - GENERAL: PAINLEVEL_OUTOF10: 6

## 2019-05-28 ASSESSMENT — PAIN DESCRIPTION - FREQUENCY: FREQUENCY: INTERMITTENT

## 2019-05-28 ASSESSMENT — PAIN DESCRIPTION - PAIN TYPE: TYPE: ACUTE PAIN

## 2019-05-28 ASSESSMENT — PAIN DESCRIPTION - ORIENTATION: ORIENTATION: MID

## 2019-05-29 ENCOUNTER — APPOINTMENT (OUTPATIENT)
Dept: GENERAL RADIOLOGY | Age: 59
End: 2019-05-29
Payer: MEDICAID

## 2019-05-29 ENCOUNTER — HOSPITAL ENCOUNTER (OUTPATIENT)
Age: 59
Setting detail: OBSERVATION
Discharge: HOME | End: 2019-05-30
Attending: EMERGENCY MEDICINE | Admitting: SURGERY
Payer: MEDICAID

## 2019-05-29 ENCOUNTER — APPOINTMENT (OUTPATIENT)
Dept: CT IMAGING | Age: 59
End: 2019-05-29
Payer: MEDICAID

## 2019-05-29 VITALS
WEIGHT: 243 LBS | OXYGEN SATURATION: 97 % | TEMPERATURE: 97.7 F | DIASTOLIC BLOOD PRESSURE: 84 MMHG | BODY MASS INDEX: 41.48 KG/M2 | HEART RATE: 97 BPM | SYSTOLIC BLOOD PRESSURE: 118 MMHG | RESPIRATION RATE: 18 BRPM | HEIGHT: 64 IN

## 2019-05-29 DIAGNOSIS — T18.108A IMPACTED FOREIGN BODY IN ESOPHAGUS, INITIAL ENCOUNTER: Primary | ICD-10-CM

## 2019-05-29 LAB
ALBUMIN SERPL-MCNC: 3.9 G/DL (ref 3.5–5.2)
ALBUMIN SERPL-MCNC: 4.5 G/DL (ref 3.5–5.2)
ALP BLD-CCNC: 101 U/L (ref 35–104)
ALP BLD-CCNC: 90 U/L (ref 35–104)
ALT SERPL-CCNC: 19 U/L (ref 0–32)
ALT SERPL-CCNC: 20 U/L (ref 0–32)
ANION GAP SERPL CALCULATED.3IONS-SCNC: 13 MMOL/L (ref 7–16)
ANION GAP SERPL CALCULATED.3IONS-SCNC: 15 MMOL/L (ref 7–16)
AST SERPL-CCNC: 13 U/L (ref 0–31)
AST SERPL-CCNC: 15 U/L (ref 0–31)
BASOPHILS ABSOLUTE: 0.04 E9/L (ref 0–0.2)
BASOPHILS ABSOLUTE: 0.05 E9/L (ref 0–0.2)
BASOPHILS RELATIVE PERCENT: 0.3 % (ref 0–2)
BASOPHILS RELATIVE PERCENT: 0.5 % (ref 0–2)
BILIRUB SERPL-MCNC: 0.4 MG/DL (ref 0–1.2)
BILIRUB SERPL-MCNC: 0.9 MG/DL (ref 0–1.2)
BUN BLDV-MCNC: 10 MG/DL (ref 6–20)
BUN BLDV-MCNC: 18 MG/DL (ref 6–20)
CALCIUM SERPL-MCNC: 9.1 MG/DL (ref 8.6–10.2)
CALCIUM SERPL-MCNC: 9.5 MG/DL (ref 8.6–10.2)
CHLORIDE BLD-SCNC: 101 MMOL/L (ref 98–107)
CHLORIDE BLD-SCNC: 104 MMOL/L (ref 98–107)
CO2: 26 MMOL/L (ref 22–29)
CO2: 27 MMOL/L (ref 22–29)
CREAT SERPL-MCNC: 0.7 MG/DL (ref 0.5–1)
CREAT SERPL-MCNC: 0.8 MG/DL (ref 0.5–1)
EOSINOPHILS ABSOLUTE: 0.12 E9/L (ref 0.05–0.5)
EOSINOPHILS ABSOLUTE: 0.24 E9/L (ref 0.05–0.5)
EOSINOPHILS RELATIVE PERCENT: 0.8 % (ref 0–6)
EOSINOPHILS RELATIVE PERCENT: 2.6 % (ref 0–6)
GFR AFRICAN AMERICAN: >60
GFR AFRICAN AMERICAN: >60
GFR NON-AFRICAN AMERICAN: >60 ML/MIN/1.73
GFR NON-AFRICAN AMERICAN: >60 ML/MIN/1.73
GLUCOSE BLD-MCNC: 107 MG/DL (ref 74–99)
GLUCOSE BLD-MCNC: 136 MG/DL (ref 74–99)
HCT VFR BLD CALC: 37.1 % (ref 34–48)
HCT VFR BLD CALC: 41.1 % (ref 34–48)
HEMOGLOBIN: 12.5 G/DL (ref 11.5–15.5)
HEMOGLOBIN: 13.7 G/DL (ref 11.5–15.5)
IMMATURE GRANULOCYTES #: 0.03 E9/L
IMMATURE GRANULOCYTES #: 0.05 E9/L
IMMATURE GRANULOCYTES %: 0.3 % (ref 0–5)
IMMATURE GRANULOCYTES %: 0.4 % (ref 0–5)
INR BLD: 1.1
LACTIC ACID: 1.2 MMOL/L (ref 0.5–2.2)
LACTIC ACID: 1.4 MMOL/L (ref 0.5–2.2)
LIPASE: 27 U/L (ref 13–60)
LIPASE: 49 U/L (ref 13–60)
LYMPHOCYTES ABSOLUTE: 2.03 E9/L (ref 1.5–4)
LYMPHOCYTES ABSOLUTE: 2.82 E9/L (ref 1.5–4)
LYMPHOCYTES RELATIVE PERCENT: 14.3 % (ref 20–42)
LYMPHOCYTES RELATIVE PERCENT: 30.7 % (ref 20–42)
MCH RBC QN AUTO: 27.7 PG (ref 26–35)
MCH RBC QN AUTO: 28 PG (ref 26–35)
MCHC RBC AUTO-ENTMCNC: 33.3 % (ref 32–34.5)
MCHC RBC AUTO-ENTMCNC: 33.7 % (ref 32–34.5)
MCV RBC AUTO: 82.1 FL (ref 80–99.9)
MCV RBC AUTO: 83.9 FL (ref 80–99.9)
MONOCYTES ABSOLUTE: 0.35 E9/L (ref 0.1–0.95)
MONOCYTES ABSOLUTE: 0.57 E9/L (ref 0.1–0.95)
MONOCYTES RELATIVE PERCENT: 3.8 % (ref 2–12)
MONOCYTES RELATIVE PERCENT: 4 % (ref 2–12)
NEUTROPHILS ABSOLUTE: 11.39 E9/L (ref 1.8–7.3)
NEUTROPHILS ABSOLUTE: 5.7 E9/L (ref 1.8–7.3)
NEUTROPHILS RELATIVE PERCENT: 62.1 % (ref 43–80)
NEUTROPHILS RELATIVE PERCENT: 80.2 % (ref 43–80)
PDW BLD-RTO: 13.8 FL (ref 11.5–15)
PDW BLD-RTO: 14.6 FL (ref 11.5–15)
PLATELET # BLD: 278 E9/L (ref 130–450)
PLATELET # BLD: 304 E9/L (ref 130–450)
PMV BLD AUTO: 10.1 FL (ref 7–12)
PMV BLD AUTO: 10.2 FL (ref 7–12)
POTASSIUM REFLEX MAGNESIUM: 3.7 MMOL/L (ref 3.5–5)
POTASSIUM REFLEX MAGNESIUM: 3.8 MMOL/L (ref 3.5–5)
PRO-BNP: 16 PG/ML (ref 0–125)
PROTHROMBIN TIME: 12.8 SEC (ref 9.3–12.4)
RBC # BLD: 4.52 E12/L (ref 3.5–5.5)
RBC # BLD: 4.9 E12/L (ref 3.5–5.5)
SODIUM BLD-SCNC: 141 MMOL/L (ref 132–146)
SODIUM BLD-SCNC: 145 MMOL/L (ref 132–146)
TOTAL PROTEIN: 8.9 G/DL (ref 6.4–8.3)
TOTAL PROTEIN: 9.5 G/DL (ref 6.4–8.3)
TROPONIN: <0.01 NG/ML (ref 0–0.03)
TROPONIN: <0.01 NG/ML (ref 0–0.03)
WBC # BLD: 14.2 E9/L (ref 4.5–11.5)
WBC # BLD: 9.2 E9/L (ref 4.5–11.5)

## 2019-05-29 PROCEDURE — 71275 CT ANGIOGRAPHY CHEST: CPT

## 2019-05-29 PROCEDURE — 83690 ASSAY OF LIPASE: CPT

## 2019-05-29 PROCEDURE — 83605 ASSAY OF LACTIC ACID: CPT

## 2019-05-29 PROCEDURE — 93005 ELECTROCARDIOGRAM TRACING: CPT | Performed by: EMERGENCY MEDICINE

## 2019-05-29 PROCEDURE — 99284 EMERGENCY DEPT VISIT MOD MDM: CPT

## 2019-05-29 PROCEDURE — 96374 THER/PROPH/DIAG INJ IV PUSH: CPT

## 2019-05-29 PROCEDURE — 94760 N-INVAS EAR/PLS OXIMETRY 1: CPT

## 2019-05-29 PROCEDURE — 85025 COMPLETE CBC W/AUTO DIFF WBC: CPT

## 2019-05-29 PROCEDURE — 6370000000 HC RX 637 (ALT 250 FOR IP): Performed by: EMERGENCY MEDICINE

## 2019-05-29 PROCEDURE — 6360000004 HC RX CONTRAST MEDICATION: Performed by: RADIOLOGY

## 2019-05-29 PROCEDURE — 80053 COMPREHEN METABOLIC PANEL: CPT

## 2019-05-29 PROCEDURE — 84484 ASSAY OF TROPONIN QUANT: CPT

## 2019-05-29 PROCEDURE — 71046 X-RAY EXAM CHEST 2 VIEWS: CPT

## 2019-05-29 PROCEDURE — 2580000003 HC RX 258: Performed by: RADIOLOGY

## 2019-05-29 PROCEDURE — 71045 X-RAY EXAM CHEST 1 VIEW: CPT

## 2019-05-29 PROCEDURE — 85610 PROTHROMBIN TIME: CPT

## 2019-05-29 PROCEDURE — 6360000002 HC RX W HCPCS: Performed by: EMERGENCY MEDICINE

## 2019-05-29 PROCEDURE — 83880 ASSAY OF NATRIURETIC PEPTIDE: CPT

## 2019-05-29 PROCEDURE — 36415 COLL VENOUS BLD VENIPUNCTURE: CPT

## 2019-05-29 RX ORDER — ONDANSETRON 2 MG/ML
4 INJECTION INTRAMUSCULAR; INTRAVENOUS ONCE
Status: COMPLETED | OUTPATIENT
Start: 2019-05-29 | End: 2019-05-29

## 2019-05-29 RX ORDER — ONDANSETRON 4 MG/1
4 TABLET, ORALLY DISINTEGRATING ORAL EVERY 8 HOURS PRN
Qty: 10 TABLET | Refills: 0 | Status: SHIPPED | OUTPATIENT
Start: 2019-05-29 | End: 2019-05-31

## 2019-05-29 RX ORDER — FAMOTIDINE 20 MG/1
20 TABLET, FILM COATED ORAL 2 TIMES DAILY
Qty: 14 TABLET | Refills: 0 | Status: SHIPPED | OUTPATIENT
Start: 2019-05-29 | End: 2019-06-12

## 2019-05-29 RX ORDER — SODIUM CHLORIDE 0.9 % (FLUSH) 0.9 %
10 SYRINGE (ML) INJECTION PRN
Status: COMPLETED | OUTPATIENT
Start: 2019-05-29 | End: 2019-05-29

## 2019-05-29 RX ORDER — ONDANSETRON 2 MG/ML
4 INJECTION INTRAMUSCULAR; INTRAVENOUS ONCE
Status: COMPLETED | OUTPATIENT
Start: 2019-05-30 | End: 2019-05-30

## 2019-05-29 RX ORDER — SUCRALFATE ORAL 1 G/10ML
1 SUSPENSION ORAL 4 TIMES DAILY
Qty: 1200 ML | Refills: 0 | Status: SHIPPED | OUTPATIENT
Start: 2019-05-29 | End: 2019-07-15

## 2019-05-29 RX ADMIN — ONDANSETRON HYDROCHLORIDE 4 MG: 2 SOLUTION INTRAMUSCULAR; INTRAVENOUS at 02:12

## 2019-05-29 RX ADMIN — LIDOCAINE HYDROCHLORIDE: 20 SOLUTION ORAL; TOPICAL at 00:31

## 2019-05-29 RX ADMIN — Medication 10 ML: at 04:54

## 2019-05-29 RX ADMIN — IOPAMIDOL 70 ML: 755 INJECTION, SOLUTION INTRAVENOUS at 04:54

## 2019-05-29 ASSESSMENT — ENCOUNTER SYMPTOMS
VOMITING: 1
COUGH: 0
CONSTIPATION: 0
SHORTNESS OF BREATH: 0
BLOOD IN STOOL: 0
NAUSEA: 1
SORE THROAT: 0
ABDOMINAL PAIN: 1
COLOR CHANGE: 0

## 2019-05-29 ASSESSMENT — PAIN DESCRIPTION - PAIN TYPE: TYPE: ACUTE PAIN

## 2019-05-29 ASSESSMENT — PAIN DESCRIPTION - LOCATION: LOCATION: CHEST

## 2019-05-29 ASSESSMENT — PAIN SCALES - GENERAL: PAINLEVEL_OUTOF10: 8

## 2019-05-29 ASSESSMENT — PAIN DESCRIPTION - DESCRIPTORS: DESCRIPTORS: ACHING

## 2019-05-29 NOTE — ED NOTES
Bed: 19  Expected date:   Expected time:   Means of arrival:   Comments:  Hold EMS     Dorn Habermann, [facility]  05/28/19 1002

## 2019-05-29 NOTE — ED PROVIDER NOTES
Fahad Venegas is a 62 y.o. female with PMH significant for HTN, HLD and CAD s/p stent placement presenting to the emergency department for Chest Pain. Patient reports she was eating a piece of chicken when she started having sudden onset of epigastric chest pain. Patient complains associated nausea with vomiting. Patient was given 4mg zofran by ems with some relief. She admits this has happened before with similar foods. Patient reports she has an appointment with her PCP tomorrow morning at 9am.        The history is provided by the patient. Chest Pain   Pain location:  Epigastric  Pain quality: burning    Pain radiates to:  Precordial region  Pain severity:  Moderate  Progression:  Unchanged  Chronicity:  Recurrent  Context: eating    Relieved by:  Nothing  Worsened by:  Deep breathing  Associated symptoms: abdominal pain, nausea and vomiting    Associated symptoms: no cough, no fever, no headache, no numbness, no palpitations and no shortness of breath        Review of Systems   Constitutional: Negative for chills and fever. HENT: Negative for congestion and sore throat. Eyes: Negative for visual disturbance. Respiratory: Negative for cough and shortness of breath. Cardiovascular: Positive for chest pain. Negative for palpitations. Gastrointestinal: Positive for abdominal pain, nausea and vomiting. Negative for blood in stool and constipation. Genitourinary: Negative for dysuria. Musculoskeletal: Negative for neck pain. Skin: Negative for color change. Neurological: Negative for numbness and headaches. Psychiatric/Behavioral: Negative for confusion. Physical Exam   Constitutional: She is oriented to person, place, and time. She appears well-developed and well-nourished. No distress. HENT:   Head: Normocephalic and atraumatic.    Nose: Nose normal.   Mouth/Throat: Oropharynx is clear and moist and mucous membranes are normal.   Eyes: Conjunctivae are normal.   Neck: Normal range of motion. Neck supple. Cardiovascular: Regular rhythm, intact distal pulses and normal pulses. Tachycardia present. No murmur heard. Pulmonary/Chest: Effort normal and breath sounds normal. She has no wheezes. She has no rhonchi. She has no rales. Abdominal: Soft. Bowel sounds are normal. There is tenderness in the epigastric area. There is no rigidity, no rebound, no guarding and no CVA tenderness. Neurological: She is alert and oriented to person, place, and time. No sensory deficit. She exhibits normal muscle tone. Skin: Skin is warm and dry. Capillary refill takes less than 2 seconds. Psychiatric: She has a normal mood and affect. Her speech is normal.   Nursing note and vitals reviewed. Procedures     MDM  Number of Diagnoses or Management Options  Chest pain, unspecified type:   Gastric reflux:   Diagnosis management comments: Patient presents to the ED for epigastric pain during eating. We initially obtained blood work, imaging and ekg. Patient was given gi cocktail and antiemetic for their symptoms with good improvement. Workup in the ED revealed suspicion for gastric reflux. Patient complained of pain worse when eating as well as pleuritic chest pain. With coming in tachycardic, pe could not be completely ruled out. CTA unremarkable for PE and showed evidence of fluid within the esophagus. EKG stable without ischemia and troponin within normal limits as we do not believe this is an atypical ACS event. Results of blood work otherwise unremarkable. Patient continues to be non-toxic on re-evaluation. Patient did not have rebound, guarding or rigidity present on exam as patient does not show signs of acute surgical abdomen during this encounter. Patient is hemodynamically stable. Findings were discussed with the patient and reasons to immediately return to the ED were articulated to them. They will follow-up with their PMD. Patient agrees with the plan and all questions were answered.         ED Course as of May 29 1726   Wed May 29, 2019   0603 Patient reassessed. Patient feeling mildly better after current interventions today. Patient believes she has not blood clot. Patient be discharged home or to follow-up with PCP    [MA]      ED Course User Index  [MA] Lee Leonardo DO       ----------------------------------------------- PAST HISTORY --------------------------------------------  Past Medical History:  has a past medical history of CAD (coronary artery disease), Chest pain, HTN (hypertension), and Hyperlipemia. Past Surgical History:  has a past surgical history that includes Coronary angioplasty with stent. Social History:  reports that she has been smoking cigarettes. She has a 39.00 pack-year smoking history. She has never used smokeless tobacco. She reports that she drinks alcohol. She reports that she does not use drugs. Family History: family history includes Asthma in her sister; Diabetes in her brother, brother, and mother; High Blood Pressure in her brother and mother; Other in her father; Stroke in her brother and brother. The patients home medications have been reviewed. Allergies: Patient has no known allergies.     ------------------------------------------------ RESULTS ---------------------------------------------------    LABS:  Results for orders placed or performed during the hospital encounter of 05/28/19   CBC Auto Differential   Result Value Ref Range    WBC 9.2 4.5 - 11.5 E9/L    RBC 4.52 3.50 - 5.50 E12/L    Hemoglobin 12.5 11.5 - 15.5 g/dL    Hematocrit 37.1 34.0 - 48.0 %    MCV 82.1 80.0 - 99.9 fL    MCH 27.7 26.0 - 35.0 pg    MCHC 33.7 32.0 - 34.5 %    RDW 13.8 11.5 - 15.0 fL    Platelets 592 507 - 351 E9/L    MPV 10.1 7.0 - 12.0 fL    Neutrophils % 62.1 43.0 - 80.0 %    Immature Granulocytes % 0.3 0.0 - 5.0 %    Lymphocytes % 30.7 20.0 - 42.0 %    Monocytes % 3.8 2.0 - 12.0 %    Eosinophils % 2.6 0.0 - 6.0 %    Basophils % 0.5 0.0 - 2.0 %    Neutrophils # 5. 70 1.80 - 7.30 E9/L    Immature Granulocytes # 0.03 E9/L    Lymphocytes # 2.82 1.50 - 4.00 E9/L    Monocytes # 0.35 0.10 - 0.95 E9/L    Eosinophils # 0.24 0.05 - 0.50 E9/L    Basophils # 0.05 0.00 - 0.20 E9/L   Comprehensive Metabolic Panel w/ Reflex to MG   Result Value Ref Range    Sodium 141 132 - 146 mmol/L    Potassium reflex Magnesium 3.8 3.5 - 5.0 mmol/L    Chloride 101 98 - 107 mmol/L    CO2 27 22 - 29 mmol/L    Anion Gap 13 7 - 16 mmol/L    Glucose 107 (H) 74 - 99 mg/dL    BUN 10 6 - 20 mg/dL    CREATININE 0.7 0.5 - 1.0 mg/dL    GFR Non-African American >60 >=60 mL/min/1.73    GFR African American >60     Calcium 9.1 8.6 - 10.2 mg/dL    Total Protein 8.9 (H) 6.4 - 8.3 g/dL    Alb 3.9 3.5 - 5.2 g/dL    Total Bilirubin 0.4 0.0 - 1.2 mg/dL    Alkaline Phosphatase 90 35 - 104 U/L    ALT 20 0 - 32 U/L    AST 15 0 - 31 U/L   Protime-INR   Result Value Ref Range    Protime 12.8 (H) 9.3 - 12.4 sec    INR 1.1    Lipase   Result Value Ref Range    Lipase 49 13 - 60 U/L   Lactic Acid, Plasma   Result Value Ref Range    Lactic Acid 1.2 0.5 - 2.2 mmol/L   Troponin   Result Value Ref Range    Troponin <0.01 0.00 - 0.03 ng/mL   Brain Natriuretic Peptide   Result Value Ref Range    Pro-BNP 16 0 - 125 pg/mL       RADIOLOGY:    All Radiology results interpreted by Radiologist unless otherwise noted. CTA CHEST W CONTRAST   Final Result   1. No pulmonary embolism. 2.  Mild bibasilar infiltrates or atelectasis. 3.  Fluid in the esophagus may be due to reflux disease. This report has been electronically signed by Meliton Penn MD.      XR CHEST PORTABLE   Final Result   No interval change                   EKG:  This EKG is signed and interpreted by ED Physician. Time:  0000   Rate: 98  Rhythm: Sinus.   Interpretation: NSR  Comparison: stable as compared to patient's most recent EKG on 7/30/18.    ---------------------------- NURSING NOTES AND VITALS REVIEWED -------------------------   The nursing notes within the ED encounter and vital signs as below have been reviewed. /84   Pulse 97   Temp 97.7 °F (36.5 °C)   Resp 18   Ht 5' 4\" (1.626 m)   Wt 243 lb (110.2 kg)   LMP  (LMP Unknown)   SpO2 97%   BMI 41.71 kg/m²   Oxygen Saturation Interpretation: Normal      ------------------------------------------PROGRESS NOTES -------------------------------------------    ED COURSE MEDICATIONS:                Medications   aluminum & magnesium hydroxide-simethicone (MAALOX) 30 mL, lidocaine viscous hcl (XYLOCAINE) 5 mL (GI COCKTAIL) ( Oral Given 5/29/19 0031)   ondansetron (ZOFRAN) injection 4 mg (4 mg Intravenous Given 5/29/19 0212)   sodium chloride flush 0.9 % injection 10 mL (10 mLs Intravenous Given 5/29/19 0454)   iopamidol (ISOVUE-370) 76 % injection 60 mL (70 mLs Intravenous Given 5/29/19 0454)       CONSULTATIONS:            none. PROCEDURES:            none. COUNSELING:   I have spoken with the patient and discussed todays results, in addition to providing specific details for the plan of care and counseling regarding the diagnosis and prognosis.     --------------------------------------- IMPRESSION & DISPOSITION --------------------------------     IMPRESSION(s):  1. Chest pain, unspecified type    2. Gastric reflux        This patient's ED course included: a personal history and physicial examination, cardiac monitoring and continuous pulse oximetry    This patient has improved and been closely monitored during their ED course. DISPOSITION:  Disposition: Discharge to home. Patient condition is stable. END OF PROVIDER NOTE.             DO Tiago Riggs  05/29/19 7374

## 2019-05-29 NOTE — ED NOTES
Discharge packet reviewed, patient verbalized understanding of follow up and medications. Used section phone to call two numbers for patient, no answer. Explained where patient phones are located.       Leticia Mcdermott RN  05/29/19 [id number]

## 2019-05-30 ENCOUNTER — ANESTHESIA (OUTPATIENT)
Dept: OPERATING ROOM | Age: 59
End: 2019-05-30
Payer: MEDICAID

## 2019-05-30 ENCOUNTER — ANESTHESIA EVENT (OUTPATIENT)
Dept: OPERATING ROOM | Age: 59
End: 2019-05-30
Payer: MEDICAID

## 2019-05-30 VITALS
HEIGHT: 64 IN | TEMPERATURE: 97 F | WEIGHT: 243 LBS | SYSTOLIC BLOOD PRESSURE: 131 MMHG | BODY MASS INDEX: 41.48 KG/M2 | OXYGEN SATURATION: 97 % | RESPIRATION RATE: 18 BRPM | HEART RATE: 110 BPM | DIASTOLIC BLOOD PRESSURE: 69 MMHG

## 2019-05-30 VITALS — SYSTOLIC BLOOD PRESSURE: 153 MMHG | OXYGEN SATURATION: 100 % | DIASTOLIC BLOOD PRESSURE: 74 MMHG

## 2019-05-30 PROBLEM — T18.108A IMPACTED FOREIGN BODY IN ESOPHAGUS: Status: ACTIVE | Noted: 2019-05-30

## 2019-05-30 LAB
EKG ATRIAL RATE: 122 BPM
EKG P AXIS: 55 DEGREES
EKG P-R INTERVAL: 138 MS
EKG Q-T INTERVAL: 338 MS
EKG QRS DURATION: 72 MS
EKG QTC CALCULATION (BAZETT): 481 MS
EKG R AXIS: 10 DEGREES
EKG T AXIS: 62 DEGREES
EKG VENTRICULAR RATE: 122 BPM

## 2019-05-30 PROCEDURE — 2500000003 HC RX 250 WO HCPCS: Performed by: REGISTERED NURSE

## 2019-05-30 PROCEDURE — 7100000001 HC PACU RECOVERY - ADDTL 15 MIN: Performed by: SURGERY

## 2019-05-30 PROCEDURE — 6360000002 HC RX W HCPCS: Performed by: SURGERY

## 2019-05-30 PROCEDURE — 93010 ELECTROCARDIOGRAM REPORT: CPT | Performed by: INTERNAL MEDICINE

## 2019-05-30 PROCEDURE — 2580000003 HC RX 258: Performed by: REGISTERED NURSE

## 2019-05-30 PROCEDURE — 3600007512: Performed by: SURGERY

## 2019-05-30 PROCEDURE — 2500000003 HC RX 250 WO HCPCS: Performed by: EMERGENCY MEDICINE

## 2019-05-30 PROCEDURE — 3700000000 HC ANESTHESIA ATTENDED CARE: Performed by: SURGERY

## 2019-05-30 PROCEDURE — 3700000001 HC ADD 15 MINUTES (ANESTHESIA): Performed by: SURGERY

## 2019-05-30 PROCEDURE — 7100000000 HC PACU RECOVERY - FIRST 15 MIN: Performed by: SURGERY

## 2019-05-30 PROCEDURE — 3600007502: Performed by: SURGERY

## 2019-05-30 PROCEDURE — 96375 TX/PRO/DX INJ NEW DRUG ADDON: CPT

## 2019-05-30 PROCEDURE — 6360000002 HC RX W HCPCS: Performed by: EMERGENCY MEDICINE

## 2019-05-30 PROCEDURE — G0378 HOSPITAL OBSERVATION PER HR: HCPCS

## 2019-05-30 PROCEDURE — 96374 THER/PROPH/DIAG INJ IV PUSH: CPT

## 2019-05-30 PROCEDURE — 2580000003 HC RX 258: Performed by: SURGERY

## 2019-05-30 PROCEDURE — 6360000002 HC RX W HCPCS: Performed by: REGISTERED NURSE

## 2019-05-30 RX ORDER — DIPHENHYDRAMINE HYDROCHLORIDE 50 MG/ML
12.5 INJECTION INTRAMUSCULAR; INTRAVENOUS
Status: DISCONTINUED | OUTPATIENT
Start: 2019-05-30 | End: 2019-05-30 | Stop reason: HOSPADM

## 2019-05-30 RX ORDER — LIDOCAINE HYDROCHLORIDE 20 MG/ML
INJECTION, SOLUTION EPIDURAL; INFILTRATION; INTRACAUDAL; PERINEURAL PRN
Status: DISCONTINUED | OUTPATIENT
Start: 2019-05-30 | End: 2019-05-30 | Stop reason: SDUPTHER

## 2019-05-30 RX ORDER — MORPHINE SULFATE 2 MG/ML
2 INJECTION, SOLUTION INTRAMUSCULAR; INTRAVENOUS
Status: DISCONTINUED | OUTPATIENT
Start: 2019-05-30 | End: 2019-05-30 | Stop reason: HOSPADM

## 2019-05-30 RX ORDER — DEXAMETHASONE SODIUM PHOSPHATE 4 MG/ML
INJECTION, SOLUTION INTRA-ARTICULAR; INTRALESIONAL; INTRAMUSCULAR; INTRAVENOUS; SOFT TISSUE PRN
Status: DISCONTINUED | OUTPATIENT
Start: 2019-05-30 | End: 2019-05-30 | Stop reason: SDUPTHER

## 2019-05-30 RX ORDER — FENTANYL CITRATE 50 UG/ML
INJECTION, SOLUTION INTRAMUSCULAR; INTRAVENOUS PRN
Status: DISCONTINUED | OUTPATIENT
Start: 2019-05-30 | End: 2019-05-30 | Stop reason: SDUPTHER

## 2019-05-30 RX ORDER — SODIUM CHLORIDE, SODIUM LACTATE, POTASSIUM CHLORIDE, CALCIUM CHLORIDE 600; 310; 30; 20 MG/100ML; MG/100ML; MG/100ML; MG/100ML
INJECTION, SOLUTION INTRAVENOUS CONTINUOUS PRN
Status: DISCONTINUED | OUTPATIENT
Start: 2019-05-30 | End: 2019-05-30 | Stop reason: SDUPTHER

## 2019-05-30 RX ORDER — MEPERIDINE HYDROCHLORIDE 25 MG/ML
12.5 INJECTION INTRAMUSCULAR; INTRAVENOUS; SUBCUTANEOUS EVERY 5 MIN PRN
Status: DISCONTINUED | OUTPATIENT
Start: 2019-05-30 | End: 2019-05-30 | Stop reason: HOSPADM

## 2019-05-30 RX ORDER — ONDANSETRON 2 MG/ML
4 INJECTION INTRAMUSCULAR; INTRAVENOUS EVERY 6 HOURS PRN
Status: DISCONTINUED | OUTPATIENT
Start: 2019-05-30 | End: 2019-05-30 | Stop reason: HOSPADM

## 2019-05-30 RX ORDER — MIDAZOLAM HYDROCHLORIDE 1 MG/ML
INJECTION INTRAMUSCULAR; INTRAVENOUS PRN
Status: DISCONTINUED | OUTPATIENT
Start: 2019-05-30 | End: 2019-05-30 | Stop reason: SDUPTHER

## 2019-05-30 RX ORDER — SODIUM CHLORIDE 9 MG/ML
INJECTION, SOLUTION INTRAVENOUS CONTINUOUS
Status: DISCONTINUED | OUTPATIENT
Start: 2019-05-30 | End: 2019-05-30 | Stop reason: HOSPADM

## 2019-05-30 RX ORDER — PROPOFOL 10 MG/ML
INJECTION, EMULSION INTRAVENOUS PRN
Status: DISCONTINUED | OUTPATIENT
Start: 2019-05-30 | End: 2019-05-30 | Stop reason: SDUPTHER

## 2019-05-30 RX ORDER — ONDANSETRON 2 MG/ML
INJECTION INTRAMUSCULAR; INTRAVENOUS PRN
Status: DISCONTINUED | OUTPATIENT
Start: 2019-05-30 | End: 2019-05-30 | Stop reason: SDUPTHER

## 2019-05-30 RX ORDER — SUCCINYLCHOLINE/SOD CL,ISO/PF 200MG/10ML
SYRINGE (ML) INTRAVENOUS PRN
Status: DISCONTINUED | OUTPATIENT
Start: 2019-05-30 | End: 2019-05-30 | Stop reason: SDUPTHER

## 2019-05-30 RX ADMIN — SODIUM CHLORIDE: 9 INJECTION, SOLUTION INTRAVENOUS at 03:30

## 2019-05-30 RX ADMIN — LIDOCAINE HYDROCHLORIDE 100 MG: 20 INJECTION, SOLUTION EPIDURAL; INFILTRATION; INTRACAUDAL; PERINEURAL at 08:15

## 2019-05-30 RX ADMIN — SODIUM CHLORIDE, POTASSIUM CHLORIDE, SODIUM LACTATE AND CALCIUM CHLORIDE: 600; 310; 30; 20 INJECTION, SOLUTION INTRAVENOUS at 08:12

## 2019-05-30 RX ADMIN — ONDANSETRON 4 MG: 2 INJECTION INTRAMUSCULAR; INTRAVENOUS at 00:20

## 2019-05-30 RX ADMIN — PROPOFOL 120 MG: 10 INJECTION, EMULSION INTRAVENOUS at 08:15

## 2019-05-30 RX ADMIN — ONDANSETRON HYDROCHLORIDE 4 MG: 2 INJECTION, SOLUTION INTRAMUSCULAR; INTRAVENOUS at 08:21

## 2019-05-30 RX ADMIN — Medication 100 MG: at 08:15

## 2019-05-30 RX ADMIN — MIDAZOLAM HYDROCHLORIDE 2 MG: 1 INJECTION, SOLUTION INTRAMUSCULAR; INTRAVENOUS at 08:12

## 2019-05-30 RX ADMIN — GLUCAGON HYDROCHLORIDE 2 MG: KIT at 00:20

## 2019-05-30 RX ADMIN — FENTANYL CITRATE 100 MCG: 50 INJECTION, SOLUTION INTRAMUSCULAR; INTRAVENOUS at 08:15

## 2019-05-30 RX ADMIN — SODIUM CHLORIDE, POTASSIUM CHLORIDE, SODIUM LACTATE AND CALCIUM CHLORIDE: 600; 310; 30; 20 INJECTION, SOLUTION INTRAVENOUS at 07:58

## 2019-05-30 RX ADMIN — MORPHINE SULFATE 2 MG: 2 INJECTION, SOLUTION INTRAMUSCULAR; INTRAVENOUS at 03:44

## 2019-05-30 RX ADMIN — DEXAMETHASONE SODIUM PHOSPHATE 10 MG: 4 INJECTION, SOLUTION INTRAMUSCULAR; INTRAVENOUS at 08:15

## 2019-05-30 ASSESSMENT — PULMONARY FUNCTION TESTS
PIF_VALUE: 2
PIF_VALUE: 24
PIF_VALUE: 1
PIF_VALUE: 33
PIF_VALUE: 1
PIF_VALUE: 12
PIF_VALUE: 32
PIF_VALUE: 35
PIF_VALUE: 1
PIF_VALUE: 2
PIF_VALUE: 1
PIF_VALUE: 44
PIF_VALUE: 2
PIF_VALUE: 1
PIF_VALUE: 27
PIF_VALUE: 1
PIF_VALUE: 24
PIF_VALUE: 35
PIF_VALUE: 2
PIF_VALUE: 24
PIF_VALUE: 2
PIF_VALUE: 27
PIF_VALUE: 24
PIF_VALUE: 1
PIF_VALUE: 35
PIF_VALUE: 60

## 2019-05-30 ASSESSMENT — PAIN SCALES - GENERAL
PAINLEVEL_OUTOF10: 0
PAINLEVEL_OUTOF10: 8
PAINLEVEL_OUTOF10: 0
PAINLEVEL_OUTOF10: 8
PAINLEVEL_OUTOF10: 0

## 2019-05-30 ASSESSMENT — PAIN DESCRIPTION - PAIN TYPE
TYPE: ACUTE PAIN
TYPE: ACUTE PAIN

## 2019-05-30 ASSESSMENT — PAIN DESCRIPTION - LOCATION
LOCATION: ABDOMEN
LOCATION: ABDOMEN

## 2019-05-30 ASSESSMENT — PAIN DESCRIPTION - PROGRESSION
CLINICAL_PROGRESSION: GRADUALLY WORSENING
CLINICAL_PROGRESSION: GRADUALLY WORSENING

## 2019-05-30 ASSESSMENT — PAIN - FUNCTIONAL ASSESSMENT
PAIN_FUNCTIONAL_ASSESSMENT: ACTIVITIES ARE NOT PREVENTED
PAIN_FUNCTIONAL_ASSESSMENT: ACTIVITIES ARE NOT PREVENTED

## 2019-05-30 ASSESSMENT — PAIN DESCRIPTION - ONSET
ONSET: ON-GOING
ONSET: ON-GOING

## 2019-05-30 ASSESSMENT — PAIN DESCRIPTION - DESCRIPTORS
DESCRIPTORS: ACHING;DISCOMFORT;DULL
DESCRIPTORS: ACHING;DISCOMFORT;DULL

## 2019-05-30 ASSESSMENT — LIFESTYLE VARIABLES: SMOKING_STATUS: 1

## 2019-05-30 ASSESSMENT — PAIN DESCRIPTION - ORIENTATION
ORIENTATION: MID;UPPER
ORIENTATION: MID;UPPER

## 2019-05-30 ASSESSMENT — PAIN DESCRIPTION - FREQUENCY
FREQUENCY: CONTINUOUS
FREQUENCY: CONTINUOUS

## 2019-05-30 NOTE — H&P
GENERAL SURGERY  H&P NOTE  5/30/2019      HPI  Saranya Duran is a 62 y.o. female who presents for evaluation of chicken stuck in her throat for two days. Nausea and vomiting. Denied sob or difficulty breathing. Has happen before in the past. No OAC, ASA or plavix. She denied fevers, chills, cp, sob, abdominal pain. Past Medical History:   Diagnosis Date    CAD (coronary artery disease)     Chest pain     HTN (hypertension)     Hyperlipemia        Past Surgical History:   Procedure Laterality Date    CORONARY ANGIOPLASTY WITH STENT PLACEMENT         Medications Prior to Admission:    Prior to Admission medications    Medication Sig Start Date End Date Taking?  Authorizing Provider   sucralfate (CARAFATE) 1 GM/10ML suspension Take 10 mLs by mouth 4 times daily 5/29/19 6/28/19 Yes Jin Vela DO   famotidine (PEPCID) 20 MG tablet Take 1 tablet by mouth 2 times daily for 7 days 5/29/19 6/5/19 Yes Jin Vela DO   ondansetron (ZOFRAN ODT) 4 MG disintegrating tablet Take 1 tablet by mouth every 8 hours as needed for Nausea or Vomiting 5/29/19  Yes Jin Vela DO   traMADol (ULTRAM) 50 MG tablet TAKE 1 TABLET BY MOUTH EVERY 8 HOURS AS NEEDED FOR PAIN 4/8/19  Yes Historical Provider, MD   atorvastatin (LIPITOR) 40 MG tablet Take 1 tablet by mouth daily 1/23/19 1/23/20 Yes Beatriz Bustillo MD   amitriptyline (ELAVIL) 10 MG tablet Take 1 tablet by mouth nightly as needed for Sleep or Pain 1/23/19  Yes Beatriz Bustillo MD   aspirin 325 MG EC tablet Take 1 tablet by mouth daily 10/29/18 10/29/19 Yes Sabina Aviles DO   metoprolol tartrate (LOPRESSOR) 25 MG tablet Take 1 tablet by mouth daily 9/24/18  Yes Sabina Aviles DO       No Known Allergies    Family History   Problem Relation Age of Onset    Diabetes Mother     High Blood Pressure Mother     Other Father         bleeding ulcer    Asthma Sister     High Blood Pressure Brother     Diabetes Brother     Stroke Brother     Diabetes Brother     Stroke chest February 15, 2018. Findings: There is chronic fibrosing lung disease without interval change. There is no acute infiltrate. Degenerative spondylosis and increased kyphosis of thoracic spine. The heart, lungs, mediastinum and regional skeleton are otherwise unremarkable. Chronic fibrosing lung disease without interval change. No acute infiltrate is appreciated. Xr Chest Portable    Result Date: 2019  Patient MRN:  [id number] : 1960 Age: 62 years Gender: Female Order Date:  2019 12:15 AM EXAM: XR CHEST PORTABLE INDICATION:  chest pain chest pain COMPARISON: 2018 FINDINGS:  There is some chronic scarring in the right lower lobe. There are no acute infiltrates or effusions. Heart size is normal.     No interval change     Cta Chest W Contrast    Result Date: 2019  EXAM:  CT Angiography Chest With Intravenous Contrast CLINICAL HISTORY:  62years old, female; Signs and symptoms; Cough and shortness of breath and other: Nausea/vomiting; Additional info: Pe TECHNIQUE:  Axial computed tomographic angiography images of the chest with intravenous contrast using pulmonary embolism protocol. All CT scans at this facility use at least one of these dose optimization techniques: automated exposure control; mA and/or kV adjustment per patient size (includes targeted exams where dose is matched to clinical indication); or iterative reconstruction. MIP reconstructed images were created and reviewed. Coronal and sagittal reformatted images were created and reviewed. CONTRAST:  60 mL of ISOVUE 370 was administered intravenously. COMPARISON:  CT CTA CHEST 2/15/2018 12:10 AM FINDINGS:   Pulmonary arteries:  Unremarkable. No pulmonary embolism. Aorta:  No acute findings. No thoracic aortic aneurysm. Lungs:  Mild bibasilar infiltrates or atelectasis. Small pleural based nodule left lower lobe. Small subpleural nodules in the right middle lobe. These have not changed.  No followup is necessary. Pleural space:  Unremarkable. No significant effusion. No pneumothorax. Heart:  Unremarkable. No cardiomegaly. No significant pericardial effusion. No evidence of RV dysfunction. Mediastinum:  Fluid in the esophagus may be due to reflux disease. Bones/joints:  No acute fracture. No dislocation. Soft tissues:  Unremarkable. Lymph nodes:  Unremarkable. No enlarged lymph nodes. 1.  No pulmonary embolism. 2.  Mild bibasilar infiltrates or atelectasis. 3.  Fluid in the esophagus may be due to reflux disease.  This report has been electronically signed by Venancio Mariano MD.        ASSESSMENT:  62 y.o. female with esophageal foreign body    PLAN:  Plan for EGD 5/30  NPO  IVF's  OOB/AAT    Will d/w attending    Electronically signed by Ginger Klein MD on 5/30/19 at 5:37 AM    Seen/examined  Agree with above  [email or web address]

## 2019-05-30 NOTE — OP NOTE
Cyrus Mcgee  YOB: 1960  [id number]    Pre-operative Diagnosis: esophageal foreign body    Post-operative Diagnosis: same    Procedure: EGD with removal foreign body    Anesthesia: General    Surgeon: Makenzie Peña MD    Assistant: None    Estimated Blood Loss: minimal    Complications: none    Specimens: none    Procedure:  Pt was taken to the OR and placed on the table in a supine position; general anesthesia was administered and a bite block was inserted, pt was rotated left side down. A lubricated gastroscope was inserted into the oropharynx and advanced into the esophagus. The esophagus was inspected throughout its length. There was a food impaction at the GE junction. With irrigation and gentle pressure, the food bolus was pushed into the gastric lumen. The stomach was entered and insufflated. The antrum was normal.  The pylorus was intubated. The first and second portions of the duodenum were normal.  The scope was pulled back into the antrum and retroflexed. The angle of the stomach was normal.  The proximal greater and lesser curves were normal.  The fundus was normal.  At the GE junction, there was no evidence of hiatal hernia. The esophagus was inspected. Ge junction at 38 cm. There was a mild stricture with inflammatory changes due to the impaction. The scope was withdrawn and removed. The patient tolerated the procedure well.     Impression: successful removal foreign body, possible stricture    Plan:full liquids, discharge home, outpt repeat with possible dilatation      Jos Roche MD

## 2019-05-30 NOTE — DISCHARGE SUMMARY
Patient ID:  Angela Easton  [id number]  62 y.o.  1960    Admit date: 5/29/2019    Discharge date and time:  5/30/2019, 9:00 AM      Admitting Physician: Blossom Primrose, MD     Discharge Physician: Blossom Primrose     Admission Diagnoses:   Impacted foreign body in esophagus, initial encounter [T18.108A]  Impacted foreign body in esophagus, initial encounter [T18.108A]    Discharged Condition: stable    Hospital Course: admitted with esophageal foreign body;  EGD with disimpaction    Disposition: home    Discharge Medications:     Medication List      CONTINUE taking these medications    amitriptyline 10 MG tablet  Commonly known as:  ELAVIL  Take 1 tablet by mouth nightly as needed for Sleep or Pain     aspirin 325 MG EC tablet  Take 1 tablet by mouth daily     atorvastatin 40 MG tablet  Commonly known as:  LIPITOR  Take 1 tablet by mouth daily     famotidine 20 MG tablet  Commonly known as:  PEPCID  Take 1 tablet by mouth 2 times daily for 7 days     metoprolol tartrate 25 MG tablet  Commonly known as:  LOPRESSOR  Take 1 tablet by mouth daily     ondansetron 4 MG disintegrating tablet  Commonly known as:  ZOFRAN ODT  Take 1 tablet by mouth every 8 hours as needed for Nausea or Vomiting     sucralfate 1 GM/10ML suspension  Commonly known as:  CARAFATE  Take 10 mLs by mouth 4 times daily     traMADol 50 MG tablet  Commonly known as:  ULTRAM            Activity: activity as tolerated    Diet: as directed    Wound Care: none needed    Follow-up with Dr. Mady Mcintyre scheduled     Electronically signed by Blossom Primrose, MD  on 5/30/2019 at 9:00 AM

## 2019-05-30 NOTE — ANESTHESIA POSTPROCEDURE EVALUATION
Department of Anesthesiology  Postprocedure Note    Patient: Cyrus Mcgee  MRN: [id number]  YOB: 1960  Date of evaluation: 5/30/2019  Time:  9:27 AM     Procedure Summary     Date:  05/30/19 Room / Location:  [facility] OR 01 / [facility] OR    Anesthesia Start:  3732 Anesthesia Stop:  0840    Procedure:  EGD FOREIGN BODY REMOVAL (N/A ) Diagnosis:  (-)    Surgeon:  Jos Roche MD Responsible Provider:  Nasreen Sanford MD    Anesthesia Type:  general ASA Status:  3 - Emergent          Anesthesia Type: general    Tiburcio Phase I: Tiburcio Score: 10    Tiburcio Phase II:      Last vitals: Reviewed and per EMR flowsheets.        Anesthesia Post Evaluation    Patient location during evaluation: PACU  Patient participation: complete - patient participated  Level of consciousness: awake and alert  Airway patency: patent  Nausea & Vomiting: no nausea and no vomiting  Complications: no  Cardiovascular status: hemodynamically stable  Respiratory status: acceptable  Hydration status: euvolemic

## 2019-05-30 NOTE — ANESTHESIA PRE PROCEDURE
Department of Anesthesiology  Preprocedure Note       Name:  Tito Arce   Age:  62 y.o.  :  1960                                          MRN:  [id number]         Date:  2019      Surgeon: Ariadna Li):  Mayola Pallas, MD    Procedure: EGD FOREIGN BODY REMOVAL (N/A )    Medications prior to admission:   Prior to Admission medications    Medication Sig Start Date End Date Taking?  Authorizing Provider   sucralfate (CARAFATE) 1 GM/10ML suspension Take 10 mLs by mouth 4 times daily 19 Yes Danielle Penny, DO   famotidine (PEPCID) 20 MG tablet Take 1 tablet by mouth 2 times daily for 7 days 19 Yes Danielle Penny, DO   ondansetron (ZOFRAN ODT) 4 MG disintegrating tablet Take 1 tablet by mouth every 8 hours as needed for Nausea or Vomiting 19  Yes Danielle Penny, DO   traMADol (ULTRAM) 50 MG tablet TAKE 1 TABLET BY MOUTH EVERY 8 HOURS AS NEEDED FOR PAIN 19  Yes Historical Provider, MD   atorvastatin (LIPITOR) 40 MG tablet Take 1 tablet by mouth daily 19 Yes Jareth Callahan MD   amitriptyline (ELAVIL) 10 MG tablet Take 1 tablet by mouth nightly as needed for Sleep or Pain 19  Yes Jareth Callahan MD   aspirin 325 MG EC tablet Take 1 tablet by mouth daily 10/29/18 10/29/19 Yes Sebastien Baird, DO   metoprolol tartrate (LOPRESSOR) 25 MG tablet Take 1 tablet by mouth daily 18  Yes Sebastien Baird, DO       Current medications:    Current Facility-Administered Medications   Medication Dose Route Frequency Provider Last Rate Last Dose    0.9 % sodium chloride infusion   Intravenous Continuous Mayola Pallas,  mL/hr at 19 0330      morphine (PF) injection 2 mg  2 mg Intravenous Q3H PRN Mayola Pallas, MD   2 mg at 19 0344    ondansetron (ZOFRAN) injection 4 mg  4 mg Intravenous Q6H PRN Mayola Pallas, MD        meperidine (DEMEROL) injection 12.5 mg  12.5 mg Intravenous Q5 Min PRN Nora Liang MD        diphenhydrAMINE (BENADRYL) injection 12.5 mg  12.5 mg Intravenous Once PRN Fredo Ward MD        HYDROmorphone (DILAUDID) injection 0.5 mg  0.5 mg Intravenous Q5 Min PRN Fredo Ward MD        HYDROmorphone (DILAUDID) injection 0.25 mg  0.25 mg Intravenous Q5 Min PRN Fredo Ward MD           Allergies:  No Known Allergies    Problem List:    Patient Active Problem List   Diagnosis Code    CAD (coronary artery disease) I25.10    Chest pain R07.9    Hyperlipemia E78.5    HTN (hypertension) I10    Gastroesophageal reflux disease without esophagitis K21.9    SOB (shortness of breath) R06.02    DANA (obstructive sleep apnea) G47.33    Carbuncle L02.93    CAROLINE positive R76.8    Trigger finger of all digits of left hand M65.322, M65.312, M65.342, M65.332, M65.352    Impacted foreign body in esophagus T18.108A       Past Medical History:        Diagnosis Date    CAD (coronary artery disease)     Chest pain     HTN (hypertension)     Hyperlipemia        Past Surgical History:        Procedure Laterality Date    CORONARY ANGIOPLASTY WITH STENT PLACEMENT         Social History:    Social History     Tobacco Use    Smoking status: Current Every Day Smoker     Packs/day: 1.00     Years: 39.00     Pack years: 39.00     Types: Cigarettes    Smokeless tobacco: Never Used    Tobacco comment: smokes 2 cigs per day   Substance Use Topics    Alcohol use: Yes     Comment: social                                Ready to quit: Not Answered  Counseling given: Not Answered  Comment: smokes 2 cigs per day      Vital Signs (Current):   Vitals:    05/29/19 2207 05/30/19 0215 05/30/19 0300   BP: (!) 141/80 125/67 120/67   Pulse: 104 105 109   Resp: 22 22 16   Temp: 98.7 °F (37.1 °C) 98 °F (36.7 °C) 98 °F (36.7 °C)   TempSrc: Oral Oral Oral   SpO2: 95% 95% 99%   Weight: 243 lb (110.2 kg)     Height: 5' 4\" (1.626 m)                                                BP Readings from Last 3 Encounters:   05/30/19 120/67   05/29/19 118/84   05/13/19 136/70       NPO Status: Time of last liquid consumption: 2300                        Time of last solid consumption: 2300                        Date of last liquid consumption: 05/29/19                        Date of last solid food consumption: 05/29/19    BMI:   Wt Readings from Last 3 Encounters:   05/29/19 243 lb (110.2 kg)   05/28/19 243 lb (110.2 kg)   05/13/19 238 lb (108 kg)     Body mass index is 41.71 kg/m². CBC:   Lab Results   Component Value Date    WBC 14.2 05/29/2019    RBC 4.90 05/29/2019    HGB 13.7 05/29/2019    HCT 41.1 05/29/2019    MCV 83.9 05/29/2019    RDW 14.6 05/29/2019     05/29/2019       CMP:   Lab Results   Component Value Date     05/29/2019    K 3.7 05/29/2019     05/29/2019    CO2 26 05/29/2019    BUN 18 05/29/2019    CREATININE 0.8 05/29/2019    GFRAA >60 05/29/2019    LABGLOM >60 05/29/2019    GLUCOSE 136 05/29/2019    PROT 9.5 05/29/2019    CALCIUM 9.5 05/29/2019    BILITOT 0.9 05/29/2019    ALKPHOS 101 05/29/2019    AST 13 05/29/2019    ALT 19 05/29/2019       POC Tests: No results for input(s): POCGLU, POCNA, POCK, POCCL, POCBUN, POCHEMO, POCHCT in the last 72 hours.     Coags:   Lab Results   Component Value Date    PROTIME 12.8 05/29/2019    INR 1.1 05/29/2019    APTT 27.0 08/30/2017       HCG (If Applicable): No results found for: PREGTESTUR, PREGSERUM, HCG, HCGQUANT     ABGs: No results found for: PHART, PO2ART, FOM4LPF, HKH3QOB, BEART, G5SNMIBZ     Type & Screen (If Applicable):  No results found for: [facility]    Anesthesia Evaluation  Patient summary reviewed no history of anesthetic complications:   Airway: Mallampati: II  TM distance: >3 FB   Neck ROM: full   Dental: normal exam         Pulmonary: breath sounds clear to auscultation  (+) sleep apnea:  current smoker                          ROS comment: Chronic fibrosing lung disease   Cardiovascular:    (+) hypertension:, valvular problems/murmurs: MR, CAD: obstructive, CABG/stent:, hyperlipidemia      ECG reviewed  Rhythm: regular  Rate: normal  Echocardiogram reviewed                  Neuro/Psych:   Negative Neuro/Psych ROS              GI/Hepatic/Renal:   (+) GERD:, morbid obesity          Endo/Other: Negative Endo/Other ROS                    Abdominal:           Vascular: negative vascular ROS. Anesthesia Plan      general     ASA 3 - emergent       Induction: intravenous and rapid sequence. MIPS: Prophylactic antiemetics administered. Anesthetic plan and risks discussed with patient. Plan discussed with CRNA.                 Julia Cleary MD   5/30/2019

## 2019-05-30 NOTE — ED PROVIDER NOTES
HPI:  5/29/19,   Time: 11:48 PM         Saranya Duran is a 62 y.o. female presenting to the ED for NV unable to tolerate water, meds or oral secretions. , beginning 24 hours ago. The complaint has been constant, moderate in severity, and worsened by water and food. Patient states that sudden onset of nausea and substernal chest pain after eating chicken yesterday. Patient went to a hospital at [facility] and was treated for chest pain. Patient was given GI cocktail IV fluids and anti-medics. She was eventually discharged home. Patient stated she is unable to tolerate any oral secretions or water since being discharged. She has been scoped in the past and stated that she's had esophageal foreign bodies previously. Unknown if she has a esophageal stricture mass or webs. This appears uncomfortable. She also complains of epigastric pain. The patient failed the bedside challenge with water. ROS:   Pertinent positives and negatives are stated within HPI, all other systems reviewed and are negative.  --------------------------------------------- PAST HISTORY ---------------------------------------------  Past Medical History:  has a past medical history of CAD (coronary artery disease), Chest pain, HTN (hypertension), and Hyperlipemia. Past Surgical History:  has a past surgical history that includes Coronary angioplasty with stent. Social History:  reports that she has been smoking cigarettes. She has a 39.00 pack-year smoking history. She has never used smokeless tobacco. She reports that she drinks alcohol. She reports that she does not use drugs. Family History: family history includes Asthma in her sister; Diabetes in her brother, brother, and mother; High Blood Pressure in her brother and mother; Other in her father; Stroke in her brother and brother. The patients home medications have been reviewed.     Allergies: Patient has no known allergies.     -------------------------------------------------- RESULTS -------------------------------------------------  All laboratory and radiology results have been personally reviewed by myself   LABS:  Results for orders placed or performed during the hospital encounter of 05/29/19   Comprehensive Metabolic Panel w/ Reflex to MG   Result Value Ref Range    Sodium 145 132 - 146 mmol/L    Potassium reflex Magnesium 3.7 3.5 - 5.0 mmol/L    Chloride 104 98 - 107 mmol/L    CO2 26 22 - 29 mmol/L    Anion Gap 15 7 - 16 mmol/L    Glucose 136 (H) 74 - 99 mg/dL    BUN 18 6 - 20 mg/dL    CREATININE 0.8 0.5 - 1.0 mg/dL    GFR Non-African American >60 >=60 mL/min/1.73    GFR African American >60     Calcium 9.5 8.6 - 10.2 mg/dL    Total Protein 9.5 (H) 6.4 - 8.3 g/dL    Alb 4.5 3.5 - 5.2 g/dL    Total Bilirubin 0.9 0.0 - 1.2 mg/dL    Alkaline Phosphatase 101 35 - 104 U/L    ALT 19 0 - 32 U/L    AST 13 0 - 31 U/L   CBC auto differential   Result Value Ref Range    WBC 14.2 (H) 4.5 - 11.5 E9/L    RBC 4.90 3.50 - 5.50 E12/L    Hemoglobin 13.7 11.5 - 15.5 g/dL    Hematocrit 41.1 34.0 - 48.0 %    MCV 83.9 80.0 - 99.9 fL    MCH 28.0 26.0 - 35.0 pg    MCHC 33.3 32.0 - 34.5 %    RDW 14.6 11.5 - 15.0 fL    Platelets 554 630 - 267 E9/L    MPV 10.2 7.0 - 12.0 fL    Neutrophils % 80.2 (H) 43.0 - 80.0 %    Immature Granulocytes % 0.4 0.0 - 5.0 %    Lymphocytes % 14.3 (L) 20.0 - 42.0 %    Monocytes % 4.0 2.0 - 12.0 %    Eosinophils % 0.8 0.0 - 6.0 %    Basophils % 0.3 0.0 - 2.0 %    Neutrophils # 11.39 (H) 1.80 - 7.30 E9/L    Immature Granulocytes # 0.05 E9/L    Lymphocytes # 2.03 1.50 - 4.00 E9/L    Monocytes # 0.57 0.10 - 0.95 E9/L    Eosinophils # 0.12 0.05 - 0.50 E9/L    Basophils # 0.04 0.00 - 0.20 E9/L   Lactic acid, plasma   Result Value Ref Range    Lactic Acid 1.4 0.5 - 2.2 mmol/L   Lipase   Result Value Ref Range    Lipase 27 13 - 60 U/L   Troponin   Result Value Ref Range    Troponin <0.01 0.00 - 0.03 ng/mL   EKG 12 Lead 0020)   glucagon (rDNA) injection 2 mg (2 mg Intravenous Given 5/30/19 0020)         Medical Decision Making:    Patient presents with difficulty swallowing liquids and solids. States she was eating chicken yesterday and developed sudden onset of chest pain. She was seen at [facility] [place] for gastritis. patient presents today with inability to swallow oral secretions or water. she does report a previous history of esophageal foreign body in the past. she is a poor historian. i did review a chest x-ray and previous records. she was given zofran for nausea iv fluids and glucagon with no change in her suspect foreign body impaction. consultation was made with on-call general surgery who agreed to admit the patient for endoscopy. Counseling: The emergency provider has spoken with the patient and discussed todays results, in addition to providing specific details for the plan of care and counseling regarding the diagnosis and prognosis. Questions are answered at this time and they are agreeable with the plan.      --------------------------------- IMPRESSION AND DISPOSITION ---------------------------------    IMPRESSION  1.  Impacted foreign body in esophagus, initial encounter        DISPOSITION  Disposition: Admit to med/surg floor  Patient condition is stable                Shanika Zarate DO  05/30/19 0320

## 2019-05-30 NOTE — PLAN OF CARE
Problem: Pain:  Goal: Pain level will decrease  Description  Pain level will decrease  5/30/2019 0957 by Clari Estrella RN  Outcome: Met This Shift  5/30/2019 0437 by Pablito Pickett RN  Outcome: Met This Shift  Goal: Control of acute pain  Description  Control of acute pain  5/30/2019 0957 by Clari Estrella RN  Outcome: Met This Shift  5/30/2019 0437 by Pablito Pickett RN  Outcome: Met This Shift     Problem: Airway Clearance - Ineffective:  Goal: Ability to maintain a clear airway will improve  Description  Ability to maintain a clear airway will improve  Outcome: Met This Shift

## 2019-05-31 LAB
EKG ATRIAL RATE: 98 BPM
EKG P AXIS: 34 DEGREES
EKG P-R INTERVAL: 162 MS
EKG Q-T INTERVAL: 374 MS
EKG QRS DURATION: 72 MS
EKG QTC CALCULATION (BAZETT): 477 MS
EKG R AXIS: 11 DEGREES
EKG T AXIS: 11 DEGREES
EKG VENTRICULAR RATE: 98 BPM

## 2019-05-31 PROCEDURE — 93010 ELECTROCARDIOGRAM REPORT: CPT | Performed by: INTERNAL MEDICINE

## 2019-06-25 ENCOUNTER — HOSPITAL ENCOUNTER (OUTPATIENT)
Age: 59
Discharge: HOME | End: 2019-06-27
Payer: MEDICAID

## 2019-06-25 ENCOUNTER — OFFICE VISIT (OUTPATIENT)
Dept: RHEUMATOLOGY | Age: 59
End: 2019-06-25
Payer: MEDICAID

## 2019-06-25 VITALS
TEMPERATURE: 96.8 F | SYSTOLIC BLOOD PRESSURE: 122 MMHG | WEIGHT: 234.4 LBS | HEIGHT: 64 IN | BODY MASS INDEX: 40.02 KG/M2 | HEART RATE: 66 BPM | DIASTOLIC BLOOD PRESSURE: 78 MMHG

## 2019-06-25 DIAGNOSIS — R76.8 ANA POSITIVE: ICD-10-CM

## 2019-06-25 DIAGNOSIS — R79.82 ELEVATED C-REACTIVE PROTEIN (CRP): ICD-10-CM

## 2019-06-25 DIAGNOSIS — M25.50 ARTHRALGIA, UNSPECIFIED JOINT: ICD-10-CM

## 2019-06-25 DIAGNOSIS — J84.9 ILD (INTERSTITIAL LUNG DISEASE) (HCC): ICD-10-CM

## 2019-06-25 DIAGNOSIS — R76.8 ANA POSITIVE: Primary | ICD-10-CM

## 2019-06-25 LAB
ALBUMIN SERPL-MCNC: 3.8 G/DL (ref 3.5–5.2)
ALP BLD-CCNC: 87 U/L (ref 35–104)
ALT SERPL-CCNC: 14 U/L (ref 0–32)
ANION GAP SERPL CALCULATED.3IONS-SCNC: 16 MMOL/L (ref 7–16)
AST SERPL-CCNC: 12 U/L (ref 0–31)
BASOPHILS ABSOLUTE: 0.04 E9/L (ref 0–0.2)
BASOPHILS RELATIVE PERCENT: 0.6 % (ref 0–2)
BILIRUB SERPL-MCNC: 0.7 MG/DL (ref 0–1.2)
BUN BLDV-MCNC: 13 MG/DL (ref 6–20)
C-REACTIVE PROTEIN: 0.8 MG/DL (ref 0–0.4)
CALCIUM SERPL-MCNC: 9.4 MG/DL (ref 8.6–10.2)
CHLORIDE BLD-SCNC: 105 MMOL/L (ref 98–107)
CO2: 24 MMOL/L (ref 22–29)
CREAT SERPL-MCNC: 0.6 MG/DL (ref 0.5–1)
EOSINOPHILS ABSOLUTE: 0.3 E9/L (ref 0.05–0.5)
EOSINOPHILS RELATIVE PERCENT: 4.2 % (ref 0–6)
GFR AFRICAN AMERICAN: >60
GFR NON-AFRICAN AMERICAN: >60 ML/MIN/1.73
GLUCOSE BLD-MCNC: 97 MG/DL (ref 74–99)
HCT VFR BLD CALC: 35.7 % (ref 34–48)
HEMOGLOBIN: 11.9 G/DL (ref 11.5–15.5)
IMMATURE GRANULOCYTES #: 0.01 E9/L
IMMATURE GRANULOCYTES %: 0.1 % (ref 0–5)
LYMPHOCYTES ABSOLUTE: 2.38 E9/L (ref 1.5–4)
LYMPHOCYTES RELATIVE PERCENT: 33.7 % (ref 20–42)
MCH RBC QN AUTO: 27.7 PG (ref 26–35)
MCHC RBC AUTO-ENTMCNC: 33.3 % (ref 32–34.5)
MCV RBC AUTO: 83.2 FL (ref 80–99.9)
MONOCYTES ABSOLUTE: 0.43 E9/L (ref 0.1–0.95)
MONOCYTES RELATIVE PERCENT: 6.1 % (ref 2–12)
NEUTROPHILS ABSOLUTE: 3.91 E9/L (ref 1.8–7.3)
NEUTROPHILS RELATIVE PERCENT: 55.3 % (ref 43–80)
PDW BLD-RTO: 14.4 FL (ref 11.5–15)
PLATELET # BLD: 273 E9/L (ref 130–450)
PMV BLD AUTO: 10.4 FL (ref 7–12)
POTASSIUM SERPL-SCNC: 4 MMOL/L (ref 3.5–5)
RBC # BLD: 4.29 E12/L (ref 3.5–5.5)
SEDIMENTATION RATE, ERYTHROCYTE: 32 MM/HR (ref 0–20)
SODIUM BLD-SCNC: 145 MMOL/L (ref 132–146)
TOTAL PROTEIN: 8.7 G/DL (ref 6.4–8.3)
WBC # BLD: 7.1 E9/L (ref 4.5–11.5)

## 2019-06-25 PROCEDURE — 99214 OFFICE O/P EST MOD 30 MIN: CPT | Performed by: INTERNAL MEDICINE

## 2019-06-25 PROCEDURE — 85025 COMPLETE CBC W/AUTO DIFF WBC: CPT

## 2019-06-25 PROCEDURE — 86140 C-REACTIVE PROTEIN: CPT

## 2019-06-25 PROCEDURE — 86235 NUCLEAR ANTIGEN ANTIBODY: CPT

## 2019-06-25 PROCEDURE — 96372 THER/PROPH/DIAG INJ SC/IM: CPT | Performed by: INTERNAL MEDICINE

## 2019-06-25 PROCEDURE — 80053 COMPREHEN METABOLIC PANEL: CPT

## 2019-06-25 PROCEDURE — 36415 COLL VENOUS BLD VENIPUNCTURE: CPT

## 2019-06-25 PROCEDURE — 85651 RBC SED RATE NONAUTOMATED: CPT

## 2019-06-25 RX ORDER — FLUTICASONE PROPIONATE AND SALMETEROL 55; 14 UG/1; UG/1
POWDER, METERED RESPIRATORY (INHALATION)
Refills: 6 | COMMUNITY
Start: 2019-06-13

## 2019-06-25 RX ORDER — TRIAMCINOLONE ACETONIDE 40 MG/ML
40 INJECTION, SUSPENSION INTRA-ARTICULAR; INTRAMUSCULAR ONCE
Status: COMPLETED | OUTPATIENT
Start: 2019-06-25 | End: 2019-06-25

## 2019-06-25 RX ORDER — OMEPRAZOLE 20 MG/1
CAPSULE, DELAYED RELEASE ORAL
Refills: 0 | COMMUNITY
Start: 2019-06-07 | End: 2019-07-17 | Stop reason: SDUPTHER

## 2019-06-25 RX ADMIN — TRIAMCINOLONE ACETONIDE 40 MG: 40 INJECTION, SUSPENSION INTRA-ARTICULAR; INTRAMUSCULAR at 15:32

## 2019-06-25 SDOH — HEALTH STABILITY: MENTAL HEALTH: HOW MANY STANDARD DRINKS CONTAINING ALCOHOL DO YOU HAVE ON A TYPICAL DAY?: 1 OR 2

## 2019-06-25 SDOH — HEALTH STABILITY: MENTAL HEALTH: HOW OFTEN DO YOU HAVE A DRINK CONTAINING ALCOHOL?: MONTHLY OR LESS

## 2019-06-25 ASSESSMENT — ENCOUNTER SYMPTOMS
EYE PAIN: 0
EYE REDNESS: 0
SORE THROAT: 0
BACK PAIN: 1
ABDOMINAL PAIN: 0
CONSTIPATION: 0
COUGH: 0
DIARRHEA: 0
VOMITING: 0
BLOOD IN STOOL: 0
CHEST TIGHTNESS: 0
EYE ITCHING: 0
SHORTNESS OF BREATH: 0
PHOTOPHOBIA: 0
WHEEZING: 0

## 2019-06-25 NOTE — PROGRESS NOTES
19    Name: Esther Agudelo  : 1960  Age: 62 y.o. Sex: female    Patient is seen at the request of Yosvany Chapa MD    Patient presents for a rheumatology consultation regarding     Chief Complaint   Patient presents with    Joint Pain     back    Joint Swelling       Questionable Sjogren;s Disease  Diffuse Arthralgia - since past two years , manifested in shoulders, hands and feet. Denies redness, warmth and swelling. Pain is worse at night. Morning stiffness lasts for 5 minutes. She takes Tramadol. Intermittent Dysphagia , underwent Endoscopy 6 months , Per patient report it was normal.  H/O Hidradenitis Suppurative  Labs = 03/15/18  CAROLINE, 1 : 614, SSA positive, SSB + double-stranded DNA negative, SCL 70 negative, CRP 1.1 reference range 0.02 0.4,  ESR 41 reference range 0-20, RF 11 (0-13), , negative, sodium 141 potassium 4 chloride 106 carbon dioxide 28 AST 50  ALT 17 creatinine 0.63  CT chest without contrast completed in February 15, 2018. \"Diffuse peribronchial/interstitial thickening throughout both lungs with scattered areas of his hazy ground glass opacity. This is most likely on the bases of chronic interstitial lung disease. 4 mm noncalcified nodules in the periphery of the right middle lobe along the inferior aspect of minor fissures. Mild Cardiomegaly. Mildly prominent bilateral hilar lymph nodes, nonspecific and most likely reactive. \"    19 :  Pain in shoulder , low back,   Pain in ankles along with swelling   Takes tramadol  Reports dry mouth and eyes  Repeat CAROLINE was neg. RF and anti CCP antibody is negative. CRP was elevated. .        Vitals:    19 1506   BP: 122/78   Site: Left Upper Arm   Position: Sitting   Pulse: 66   Temp: 96.8 °F (36 °C)   TempSrc: Temporal   Weight: 234 lb 6.4 oz (106.3 kg)   Height: 5' 4\" (1.626 m)        Review of Systems   Constitutional: Negative for fatigue, fever and unexpected weight change.    HENT: Negative for mouth sores, nosebleeds and sore throat. Dry mouth      Eyes: Negative for photophobia, pain, redness, itching and visual disturbance. Respiratory: Negative for cough, chest tightness, shortness of breath and wheezing. Cardiovascular: Negative for chest pain, palpitations and leg swelling. Gastrointestinal: Negative for abdominal pain, blood in stool, constipation, diarrhea and vomiting. Genitourinary: Negative for dysuria, flank pain, genital sores, hematuria and urgency. Musculoskeletal: Positive for arthralgias, back pain and neck stiffness. Negative for gait problem. Skin: Negative for rash. Allergic/Immunologic: Negative for environmental allergies and food allergies. Neurological: Negative for dizziness, seizures, syncope, weakness, light-headedness, numbness and headaches. Hematological: Negative for adenopathy. Does not bruise/bleed easily. Psychiatric/Behavioral: The patient is not nervous/anxious. Current Outpatient Medications:     Fluticasone-Salmeterol 55-14 MCG/ACT AEPB, INL 1 PUFF ITL D, Disp: , Rfl: 6    omeprazole (PRILOSEC) 20 MG delayed release capsule, TK 1 C PO D, Disp: , Rfl: 0    Fluticasone-Salmeterol (AIRDUO RESPICLICK 266/79) 310-83 MCG/ACT AEPB, Inhale 1 puff into the lungs 2 times daily, Disp: 1 each, Rfl: 5    nicotine (NICODERM CQ) 7 MG/24HR, Place 1 patch onto the skin daily for 14 days, Disp: 14 patch, Rfl: 0    traMADol (ULTRAM) 50 MG tablet, Take 1 tablet by mouth every 8 hours as needed for Pain for up to 30 days. , Disp: 90 tablet, Rfl: 0    sucralfate (CARAFATE) 1 GM/10ML suspension, Take 10 mLs by mouth 4 times daily, Disp: 1200 mL, Rfl: 0    atorvastatin (LIPITOR) 40 MG tablet, Take 1 tablet by mouth daily, Disp: 90 tablet, Rfl: 1    amitriptyline (ELAVIL) 10 MG tablet, Take 1 tablet by mouth nightly as needed for Sleep or Pain, Disp: 90 tablet, Rfl: 1    aspirin 325 MG EC tablet, Take 1 tablet by mouth daily, Disp: 90 tablet, Rfl: 3    metoprolol tartrate (LOPRESSOR) 25 MG tablet, Take 1 tablet by mouth daily, Disp: 90 tablet, Rfl: 3  No Known Allergies        Past Medical History:   Diagnosis Date    CAROLINE positive 7/23/2018    CAD (coronary artery disease)     Chest pain     HTN (hypertension)     Hyperlipemia      Family History   Problem Relation Age of Onset    Diabetes Mother     High Blood Pressure Mother     Other Father         bleeding ulcer    Asthma Sister     High Blood Pressure Brother     Diabetes Brother     Stroke Brother     Diabetes Brother     Stroke Brother       Past Surgical History:   Procedure Laterality Date    CORONARY ANGIOPLASTY WITH STENT PLACEMENT      UPPER GASTROINTESTINAL ENDOSCOPY N/A 5/30/2019    EGD FOREIGN BODY REMOVAL performed by Rubi Denton MD at [facility] OR      Social History     Socioeconomic History    Marital status: Single     Spouse name: Not on file    Number of children: Not on file    Years of education: Not on file    Highest education level: Not on file   Occupational History    Occupation: not employed   Social Needs    Financial resource strain: Not on file    Food insecurity:     Worry: Not on file     Inability: Not on file   TournEase needs:     Medical: Not on file     Non-medical: Not on file   Tobacco Use    Smoking status: Current Every Day Smoker     Packs/day: 1.00     Years: 39.00     Pack years: 39.00     Types: Cigarettes    Smokeless tobacco: Never Used    Tobacco comment: smokes 2 cigs per day   Substance and Sexual Activity    Alcohol use: Yes     Frequency: Monthly or less     Drinks per session: 1 or 2     Binge frequency: Never     Comment: social    Drug use: Never    Sexual activity: Not Currently     Partners: Male     Birth control/protection: Post-menopausal   Lifestyle    Physical activity:     Days per week: Not on file     Minutes per session: Not on file    Stress: Not on file   Relationships    Social connections:     Talks on phone: Not on file Gets together: Not on file     Attends Rastafarian service: Not on file     Active member of club or organization: Not on file     Attends meetings of clubs or organizations: Not on file     Relationship status: Not on file    Intimate partner violence:     Fear of current or ex partner: Not on file     Emotionally abused: Not on file     Physically abused: Not on file     Forced sexual activity: Not on file   Other Topics Concern    Not on file   Social History Narrative    Not on file      Social History     Social History Narrative    Not on file        Vitals:    06/25/19 1506   BP: 122/78   Site: Left Upper Arm   Position: Sitting   Pulse: 66   Temp: 96.8 °F (36 °C)   TempSrc: Temporal   Weight: 234 lb 6.4 oz (106.3 kg)   Height: 5' 4\" (1.626 m)        Physical Exam   Constitutional: She appears well-developed and well-nourished. No distress. HENT:   Head: Normocephalic. Right Ear: External ear normal.   Left Ear: External ear normal.   Mouth/Throat: No oropharyngeal exudate. Dry mouth   Prominent bilateral parotid glands enlargement. Eyes: Pupils are equal, round, and reactive to light. Conjunctivae are normal. Right eye exhibits no discharge. Left eye exhibits no discharge. No scleral icterus. Neck: Normal range of motion. Neck supple. No thyromegaly present. Cardiovascular: Normal rate, regular rhythm, normal heart sounds and intact distal pulses. Pulmonary/Chest: Effort normal. No stridor. She has no wheezes. She has no rales. She exhibits no tenderness. Abdominal: Soft. Bowel sounds are normal. She exhibits no distension and no mass. There is no tenderness. There is no rebound and no guarding. No hernia. Musculoskeletal: Normal range of motion. Tenderness on palpation of most of joints   dependent edema affecting bilateral ankles. Lymphadenopathy:     She has no cervical adenopathy. Skin: Skin is warm and dry. Capillary refill takes less than 2 seconds. No rash noted.  She is not diaphoretic. No erythema. No pallor. Psychiatric: She has a normal mood and affect. Her behavior is normal. Judgment and thought content normal.        Peter Steven was seen today for joint pain and joint swelling. Diagnoses and all orders for this visit:    CAROLINE positive  -     Sjogren's Ab (SS-A, SS-B); Future  -     C-Reactive Protein; Future  -     Sedimentation Rate; Future  -     Comprehensive Metabolic Panel; Future  -     CBC Auto Differential; Future    ILD (interstitial lung disease) (HCC)    Arthralgia, unspecified joint  -     Sjogren's Ab (SS-A, SS-B); Future  -     C-Reactive Protein; Future  -     Sedimentation Rate; Future  -     Comprehensive Metabolic Panel; Future  -     CBC Auto Differential; Future    Elevated C-reactive protein (CRP)  -     Sjogren's Ab (SS-A, SS-B); Future  -     C-Reactive Protein; Future  -     Sedimentation Rate; Future  -     Comprehensive Metabolic Panel; Future  -     CBC Auto Differential; Future    Other orders  -     triamcinolone acetonide (KENALOG-40) injection 40 mg       - Repeat CAROLINE was negative however SSA could still be positive. I will repeat test and if it came back positive will send her lip gland biopsy   - Pain in multiple joints along with mildly elevated C-RP   - Will consider kenalog injection 40 mg IM , reviewed side effects including but not limited to anemia, hyperglycemia, mood changes, AVN, increased risk of infection. Patient verbalized understanding. Repeat C-RP after completing prednisone. - Will also discuss about HCQ on next visit. Return in about 1 month (around 7/23/2019). Jocelyn Mathur MD     *This document was created using voice recognition software. Note was reviewed, however may contain grammatical errors.

## 2019-06-26 LAB
ENA TO SSA (RO) ANTIBODY: POSITIVE
ENA TO SSB (LA) ANTIBODY: NEGATIVE

## 2019-07-11 ENCOUNTER — PREP FOR PROCEDURE (OUTPATIENT)
Dept: SURGERY | Age: 59
End: 2019-07-11

## 2019-07-11 RX ORDER — SODIUM CHLORIDE 9 MG/ML
INJECTION, SOLUTION INTRAVENOUS CONTINUOUS
Status: CANCELLED | OUTPATIENT
Start: 2019-07-11

## 2019-07-15 PROBLEM — Z72.0 TOBACCO ABUSE: Status: ACTIVE | Noted: 2019-07-15

## 2019-08-06 ENCOUNTER — OFFICE VISIT (OUTPATIENT)
Dept: RHEUMATOLOGY | Age: 59
End: 2019-08-06
Payer: MEDICAID

## 2019-08-06 VITALS
BODY MASS INDEX: 39.85 KG/M2 | DIASTOLIC BLOOD PRESSURE: 76 MMHG | HEART RATE: 66 BPM | HEIGHT: 64 IN | WEIGHT: 233.4 LBS | SYSTOLIC BLOOD PRESSURE: 120 MMHG | TEMPERATURE: 97.7 F

## 2019-08-06 DIAGNOSIS — R76.8 ANA POSITIVE: Primary | ICD-10-CM

## 2019-08-06 PROCEDURE — 99214 OFFICE O/P EST MOD 30 MIN: CPT | Performed by: INTERNAL MEDICINE

## 2019-08-06 ASSESSMENT — ENCOUNTER SYMPTOMS
EYE PAIN: 0
DIARRHEA: 0
BACK PAIN: 1
CHEST TIGHTNESS: 0
EYE ITCHING: 0
CONSTIPATION: 0
COUGH: 0
SHORTNESS OF BREATH: 0
PHOTOPHOBIA: 0
EYE REDNESS: 0
VOMITING: 0
WHEEZING: 0
ABDOMINAL PAIN: 0
SORE THROAT: 0
BLOOD IN STOOL: 0

## 2019-08-06 NOTE — PROGRESS NOTES
Used    Tobacco comment: smokes 3-4 cigs per day   Substance and Sexual Activity    Alcohol use: Yes     Frequency: Monthly or less     Drinks per session: 1 or 2     Binge frequency: Never     Comment: social    Drug use: Never    Sexual activity: Not Currently     Partners: Male     Birth control/protection: Post-menopausal   Lifestyle    Physical activity:     Days per week: Not on file     Minutes per session: Not on file    Stress: Not on file   Relationships    Social connections:     Talks on phone: Not on file     Gets together: Not on file     Attends Muslim service: Not on file     Active member of club or organization: Not on file     Attends meetings of clubs or organizations: Not on file     Relationship status: Not on file    Intimate partner violence:     Fear of current or ex partner: Not on file     Emotionally abused: Not on file     Physically abused: Not on file     Forced sexual activity: Not on file   Other Topics Concern    Not on file   Social History Narrative    Not on file      Social History     Social History Narrative    Not on file        Vitals:    08/06/19 1501   BP: 120/76   Site: Left Upper Arm   Position: Sitting   Pulse: 66   Temp: 97.7 °F (36.5 °C)   TempSrc: Temporal   SpO2: Comment: unable to obtain d/t artificial nails   Weight: 233 lb 6.4 oz (105.9 kg)   Height: 5' 4\" (1.626 m)        Physical Exam   Constitutional: She appears well-developed and well-nourished. No distress. HENT:   Head: Normocephalic. Right Ear: External ear normal.   Left Ear: External ear normal.   Mouth/Throat: No oropharyngeal exudate. Dry mouth   Prominent bilateral parotid glands enlargement. Eyes: Pupils are equal, round, and reactive to light. Conjunctivae are normal. Right eye exhibits no discharge. Left eye exhibits no discharge. No scleral icterus. Neck: Normal range of motion. Neck supple. No thyromegaly present.    Cardiovascular: Normal rate, regular rhythm, normal

## 2019-08-18 ENCOUNTER — ANESTHESIA EVENT (OUTPATIENT)
Dept: ENDOSCOPY | Age: 59
End: 2019-08-18
Payer: MEDICAID

## 2019-08-18 NOTE — ANESTHESIA PRE PROCEDURE
PUFF ITL D  6    Fluticasone-Salmeterol (AIRDUO RESPICLICK 730/63) 912-41 MCG/ACT AEPB Inhale 1 puff into the lungs 2 times daily 1 each 5    amitriptyline (ELAVIL) 10 MG tablet Take 1 tablet by mouth nightly as needed for Sleep or Pain 90 tablet 1     No current facility-administered medications for this visit.         Allergies:  No Known Allergies    Problem List:    Patient Active Problem List   Diagnosis Code    CAD (coronary artery disease) I25.10    Chest pain R07.9    Hyperlipemia E78.5    HTN (hypertension) I10    Gastroesophageal reflux disease without esophagitis K21.9    SOB (shortness of breath) R06.02    DANA (obstructive sleep apnea) G47.33    Carbuncle L02.93    CAROLINE positive R76.8    Trigger finger of all digits of left hand M65.322, M65.312, M65.342, M65.332, M65.352    Impacted foreign body in esophagus T18.108A    ILD [facility]interstitial lung disease) ([facility]) J84.9    Joint pain M25.50    Elevated C-reactive protein (CRP) R79.82    Tobacco abuse Z72.0       Past Medical History:        Diagnosis Date    CAROLINE positive 7/23/2018    Arthritis     RA    CAD (coronary artery disease)     Chest pain     H/O    COPD (chronic obstructive pulmonary disease) ([facility])     HTN (hypertension)     Hyperlipemia     ILD (interstitial lung disease) ([facility] [address]     Impacted foreign body in esophagus 05/30/2019    DANA (obstructive sleep apnea)     Scleroderma (HCC)     Trigger finger of left hand        Past Surgical History:        Procedure Laterality Date    CORONARY ANGIOPLASTY WITH STENT PLACEMENT  2015    UPPER GASTROINTESTINAL ENDOSCOPY N/A 5/30/2019    EGD FOREIGN BODY REMOVAL performed by Vitaliy Thomas MD at [facility] OR       Social History:    Social History     Tobacco Use    Smoking status: Current Every Day Smoker     Packs/day: 0.25     Years: 39.00     Pack years: 9.75     Types: Cigarettes    Smokeless tobacco: Never Used    Tobacco comment: smokes 3-4 cigs per day   Substance Use Topics    Alcohol use: Yes     Frequency: Monthly or less     Drinks per session: 1 or 2     Binge frequency: Never     Comment: social                                Ready to quit: Not Answered  Counseling given: Not Answered  Comment: smokes 3-4 cigs per day      Vital Signs (Current): There were no vitals filed for this visit. BP Readings from Last 3 Encounters:   08/06/19 120/76   07/15/19 112/72   06/25/19 122/78       NPO Status:                                                                                 BMI:   Wt Readings from Last 3 Encounters:   08/06/19 233 lb 6.4 oz (105.9 kg)   07/15/19 233 lb (105.7 kg)   06/25/19 234 lb 6.4 oz (106.3 kg)     There is no height or weight on file to calculate BMI.    CBC:   Lab Results   Component Value Date    WBC 7.1 06/25/2019    RBC 4.29 06/25/2019    HGB 11.9 06/25/2019    HCT 35.7 06/25/2019    MCV 83.2 06/25/2019    RDW 14.4 06/25/2019     06/25/2019       CMP:   Lab Results   Component Value Date     06/25/2019    K 4.0 06/25/2019    K 3.7 05/29/2019     06/25/2019    CO2 24 06/25/2019    BUN 13 06/25/2019    CREATININE 0.6 06/25/2019    GFRAA >60 06/25/2019    LABGLOM >60 06/25/2019    GLUCOSE 97 06/25/2019    PROT 8.7 06/25/2019    CALCIUM 9.4 06/25/2019    BILITOT 0.7 06/25/2019    ALKPHOS 87 06/25/2019    AST 12 06/25/2019    ALT 14 06/25/2019       POC Tests: No results for input(s): POCGLU, POCNA, POCK, POCCL, POCBUN, POCHEMO, POCHCT in the last 72 hours.     Coags:   Lab Results   Component Value Date    PROTIME 12.8 05/29/2019    INR 1.1 05/29/2019    APTT 27.0 08/30/2017       HCG (If Applicable): No results found for: PREGTESTUR, PREGSERUM, HCG, HCGQUANT     ABGs: No results found for: PHART, PO2ART, CZZ5RHT, VFJ6WYS, BEART, M8ABHRVZ     Type & Screen (If Applicable):  No results found for: LABABO, [address]    Anesthesia Evaluation  Patient summary reviewed no history of anesthetic complications:

## 2019-08-19 ENCOUNTER — HOSPITAL ENCOUNTER (OUTPATIENT)
Age: 59
Setting detail: OUTPATIENT SURGERY
Discharge: HOME | End: 2019-08-19
Attending: SURGERY | Admitting: SURGERY
Payer: MEDICAID

## 2019-08-19 ENCOUNTER — ANESTHESIA (OUTPATIENT)
Dept: ENDOSCOPY | Age: 59
End: 2019-08-19
Payer: MEDICAID

## 2019-08-19 VITALS
DIASTOLIC BLOOD PRESSURE: 77 MMHG | RESPIRATION RATE: 14 BRPM | SYSTOLIC BLOOD PRESSURE: 140 MMHG | OXYGEN SATURATION: 93 %

## 2019-08-19 VITALS
SYSTOLIC BLOOD PRESSURE: 118 MMHG | WEIGHT: 233 LBS | TEMPERATURE: 97.5 F | BODY MASS INDEX: 39.78 KG/M2 | DIASTOLIC BLOOD PRESSURE: 71 MMHG | HEART RATE: 70 BPM | HEIGHT: 64 IN | OXYGEN SATURATION: 100 % | RESPIRATION RATE: 16 BRPM

## 2019-08-19 PROCEDURE — 3609017700 HC EGD DILATION GASTRIC/DUODENAL STRICTURE: Performed by: SURGERY

## 2019-08-19 PROCEDURE — 2709999900 HC NON-CHARGEABLE SUPPLY: Performed by: SURGERY

## 2019-08-19 PROCEDURE — 3700000000 HC ANESTHESIA ATTENDED CARE: Performed by: SURGERY

## 2019-08-19 PROCEDURE — 6360000002 HC RX W HCPCS: Performed by: ANESTHESIOLOGY

## 2019-08-19 PROCEDURE — 88342 IMHCHEM/IMCYTCHM 1ST ANTB: CPT

## 2019-08-19 PROCEDURE — 6360000002 HC RX W HCPCS: Performed by: NURSE ANESTHETIST, CERTIFIED REGISTERED

## 2019-08-19 PROCEDURE — 7100000011 HC PHASE II RECOVERY - ADDTL 15 MIN: Performed by: SURGERY

## 2019-08-19 PROCEDURE — 2580000003 HC RX 258: Performed by: SURGERY

## 2019-08-19 PROCEDURE — 7100000010 HC PHASE II RECOVERY - FIRST 15 MIN: Performed by: SURGERY

## 2019-08-19 PROCEDURE — 88305 TISSUE EXAM BY PATHOLOGIST: CPT

## 2019-08-19 RX ORDER — ALBUTEROL SULFATE 2.5 MG/3ML
2.5 SOLUTION RESPIRATORY (INHALATION) ONCE
Status: COMPLETED | OUTPATIENT
Start: 2019-08-19 | End: 2019-08-19

## 2019-08-19 RX ORDER — SODIUM CHLORIDE 9 MG/ML
INJECTION, SOLUTION INTRAVENOUS CONTINUOUS
Status: DISCONTINUED | OUTPATIENT
Start: 2019-08-19 | End: 2019-08-19 | Stop reason: HOSPADM

## 2019-08-19 RX ORDER — PROPOFOL 10 MG/ML
INJECTION, EMULSION INTRAVENOUS PRN
Status: DISCONTINUED | OUTPATIENT
Start: 2019-08-19 | End: 2019-08-19 | Stop reason: SDUPTHER

## 2019-08-19 RX ADMIN — ALBUTEROL SULFATE 2.5 MG: 2.5 SOLUTION RESPIRATORY (INHALATION) at 07:51

## 2019-08-19 RX ADMIN — SODIUM CHLORIDE: 9 INJECTION, SOLUTION INTRAVENOUS at 07:55

## 2019-08-19 RX ADMIN — PROPOFOL 210 MG: 10 INJECTION, EMULSION INTRAVENOUS at 08:03

## 2019-08-19 ASSESSMENT — PAIN DESCRIPTION - PAIN TYPE
TYPE: SURGICAL PAIN
TYPE: SURGICAL PAIN

## 2019-08-19 ASSESSMENT — LIFESTYLE VARIABLES: SMOKING_STATUS: 1

## 2019-08-19 ASSESSMENT — PAIN SCALES - GENERAL
PAINLEVEL_OUTOF10: 0
PAINLEVEL_OUTOF10: 0

## 2019-08-19 ASSESSMENT — COPD QUESTIONNAIRES: CAT_SEVERITY: MODERATE

## 2019-08-19 ASSESSMENT — PAIN - FUNCTIONAL ASSESSMENT: PAIN_FUNCTIONAL_ASSESSMENT: 0-10

## 2019-08-19 ASSESSMENT — ENCOUNTER SYMPTOMS: SHORTNESS OF BREATH: 0

## 2019-08-19 NOTE — ANESTHESIA POSTPROCEDURE EVALUATION
Department of Anesthesiology  Postprocedure Note    Patient: Fatou Loja  MRN: [id number]  YOB: 1960  Date of evaluation: 8/19/2019  Time:  9:56 AM     Procedure Summary     Date:  08/19/19 Room / Location:  [facility] / [facility] ENDOSCOPY    Anesthesia Start:  0801 Anesthesia Stop:  2777    Procedure:  EGD ESOPHAGOGASTRODUODENOSCOPY DILATATION (N/A ) Diagnosis:  (DYSPHAGIA)    Surgeon:  Nakul Nj MD Responsible Provider:  Michelle Beck MD    Anesthesia Type:  MAC ASA Status:  3          Anesthesia Type: MAC    Tiburcio Phase I:      Tiburcio Phase II: Tiburcio Score: 10    Last vitals: Reviewed and per EMR flowsheets.        Anesthesia Post Evaluation    Patient location during evaluation: PACU  Patient participation: complete - patient participated  Level of consciousness: awake and alert  Airway patency: patent  Nausea & Vomiting: no vomiting and no nausea  Complications: no  Cardiovascular status: blood pressure returned to baseline  Respiratory status: acceptable  Hydration status: euvolemic

## 2019-09-09 ENCOUNTER — OFFICE VISIT (OUTPATIENT)
Dept: CARDIOLOGY CLINIC | Age: 59
End: 2019-09-09
Payer: MEDICAID

## 2019-09-09 VITALS
RESPIRATION RATE: 16 BRPM | WEIGHT: 232 LBS | HEART RATE: 72 BPM | BODY MASS INDEX: 39.61 KG/M2 | SYSTOLIC BLOOD PRESSURE: 116 MMHG | DIASTOLIC BLOOD PRESSURE: 70 MMHG | HEIGHT: 64 IN

## 2019-09-09 DIAGNOSIS — I51.9 HEART PROBLEM: Primary | ICD-10-CM

## 2019-09-09 PROCEDURE — 93000 ELECTROCARDIOGRAM COMPLETE: CPT | Performed by: INTERNAL MEDICINE

## 2019-09-09 PROCEDURE — G8427 DOCREV CUR MEDS BY ELIG CLIN: HCPCS | Performed by: INTERNAL MEDICINE

## 2019-09-09 PROCEDURE — 99214 OFFICE O/P EST MOD 30 MIN: CPT | Performed by: INTERNAL MEDICINE

## 2019-09-09 PROCEDURE — G8598 ASA/ANTIPLAT THER USED: HCPCS | Performed by: INTERNAL MEDICINE

## 2019-09-09 PROCEDURE — 3017F COLORECTAL CA SCREEN DOC REV: CPT | Performed by: INTERNAL MEDICINE

## 2019-09-09 PROCEDURE — G8417 CALC BMI ABV UP PARAM F/U: HCPCS | Performed by: INTERNAL MEDICINE

## 2019-09-09 PROCEDURE — 4004F PT TOBACCO SCREEN RCVD TLK: CPT | Performed by: INTERNAL MEDICINE

## 2019-09-09 RX ORDER — FLUTICASONE FUROATE AND VILANTEROL 100; 25 UG/1; UG/1
POWDER RESPIRATORY (INHALATION) DAILY
COMMUNITY
End: 2020-04-16 | Stop reason: SDUPTHER

## 2019-09-09 NOTE — PROGRESS NOTES
CHIEF COMPLAINT: CAD/MI    HISTORY OF PRESENT ILLNESS: Patient is a 61 y.o. female seen at the request of Sydni Hernandez MD.      Patient in follow up chest pain. Stress 10/17 and echo normal 3/18. Past Medical History:   Diagnosis Date    CAROLINE positive 7/23/2018    Arthritis     RA    CAD (coronary artery disease)     Chest pain     H/O    COPD (chronic obstructive pulmonary disease) (HCC)     HTN (hypertension)     Hyperlipemia     ILD (interstitial lung disease) ([address]     Impacted foreign body in esophagus 05/30/2019    DANA (obstructive sleep apnea)     Scleroderma (HCC)     Trigger finger of left hand        Patient Active Problem List   Diagnosis    CAD (coronary artery disease)    Chest pain    Hyperlipemia    HTN (hypertension)    Gastroesophageal reflux disease without esophagitis    SOB (shortness of breath)    DANA (obstructive sleep apnea)    Carbuncle    CAROLINE positive    Trigger finger of all digits of left hand    Impacted foreign body in esophagus    ILD (interstitial lung disease) (HCC)    Joint pain    Elevated C-reactive protein (CRP)    Tobacco abuse       No Known Allergies    Current Outpatient Medications   Medication Sig Dispense Refill    fluticasone-vilanterol (BREO ELLIPTA) 100-25 MCG/INH AEPB inhaler Inhale into the lungs daily      traMADol (ULTRAM) 50 MG tablet Take 1 tablet by mouth every 8 hours as needed for Pain for up to 30 days.  90 tablet 0    metoprolol tartrate (LOPRESSOR) 25 MG tablet Take 1 tablet by mouth daily 90 tablet 0    atorvastatin (LIPITOR) 40 MG tablet Take 1 tablet by mouth daily 90 tablet 0    aspirin 325 MG EC tablet Take 1 tablet by mouth daily 90 tablet 2    omeprazole (PRILOSEC) 20 MG delayed release capsule Take 1 capsule by mouth Daily 30 capsule 2    Fluticasone-Salmeterol 55-14 MCG/ACT AEPB INL 1 PUFF ITL D  6    Fluticasone-Salmeterol (AIRDUO RESPICLICK 936/49) 601-68 MCG/ACT AEPB Inhale 1 puff into the lungs 2 times Blood Pressure Mother     Other Father         bleeding ulcer    Asthma Sister     High Blood Pressure Brother     Diabetes Brother     Stroke Brother     Diabetes Brother     Stroke Brother      Review of Systems:  Heart: as above   Lungs: as above   Eyes: denies changes in vision or discharge. Ears: denies changes in hearing or pain. Nose: denies epistaxis or masses   Throat: denies sore throat or trouble swallowing. Neuro: denies numbness, tingling, tremors. Skin: denies rashes or itching. : denies hematuria, dysuria   GI: denies vomiting, diarrhea   Psych: denies mood changed, anxiety, depression. All other systems negative. Physical Exam   /70   Pulse 72   Resp 16   Ht 5' 4\" (1.626 m)   Wt 232 lb (105.2 kg)   LMP  (LMP Unknown)   BMI 39.82 kg/m²   Constitutional: Oriented to person, place, and time. Well-developed and well-nourished. No distress. Head: Normocephalic and atraumatic. Eyes: EOM are normal. Pupils are equal, round, and reactive to light. Neck: Normal range of motion. Neck supple. No hepatojugular reflux and no JVD present. Carotid bruit is not present. No tracheal deviation present. No thyromegaly present. Cardiovascular: Normal rate, regular rhythm, normal heart sounds and intact distal pulses. Exam reveals no gallop and no friction rub. No murmur heard. Pulmonary/Chest: Effort normal and breath sounds normal. No respiratory distress. No wheezes. No rales. No tenderness. Abdominal: Soft. Bowel sounds are normal. No distension and no mass. No tenderness. No rebound and no guarding. Musculoskeletal: Normal range of motion. No edema and no tenderness. Lymphadenopathy:   No cervical adenopathy. No groin adenopathy. Neurological: Alert and oriented to person, place, and time. Skin: Skin is warm and dry. No rash noted. Not diaphoretic. No erythema. Psychiatric: Normal mood and affect.  Behavior is normal.     EKG:  normal sinus rhythm, nonspecific

## 2019-09-18 ENCOUNTER — TELEPHONE (OUTPATIENT)
Dept: CARDIOLOGY CLINIC | Age: 59
End: 2019-09-18

## 2019-12-17 ENCOUNTER — NURSE TRIAGE (OUTPATIENT)
Dept: OTHER | Facility: CLINIC | Age: 59
End: 2019-12-17

## (undated) DEVICE — FORCEPS BX OVL CUP FEN DISPOSABLE CAP L 160CM RAD JAW 4

## (undated) DEVICE — GRADUATE TRIANG MEASURE 1000ML BLK PRNT